# Patient Record
Sex: FEMALE
[De-identification: names, ages, dates, MRNs, and addresses within clinical notes are randomized per-mention and may not be internally consistent; named-entity substitution may affect disease eponyms.]

---

## 2017-07-15 NOTE — RAD
PROCEDURE:  Radiographs of the Right Shoulder



HISTORY:

pain r/o fx







COMPARISON:

No prior.



FINDINGS:



BONES:

Normal. No fracture.



JOINTS:

Normal. Glenohumeral and acromioclavicular joints preserved. No 

osteoarthritis.



SOFT TISSUES:

Soft tissue calcifications seen adjacent to the humeral head 

suggestive of calcified tendinitis



OTHER FINDINGS:

None.



IMPRESSION:

No evidence of acute fracture or dislocation.  Findings suspicious 

for calcified tendinitis.

## 2017-07-15 NOTE — RAD
PROCEDURE:  Radiographs of the right elbow.



HISTORY:

pain s/p trauma r/o fx  



COMPARISON:

No prior.



FINDINGS:



BONES:

Normal. No fracture.



JOINTS:

Normal. No osteoarthritis.



SOFT TISSUES:

Normal.



JOINT EFFUSION:

None.



OTHER FINDINGS:

None.



IMPRESSION:

Unremarkable radiographs of the right elbow.

## 2017-07-15 NOTE — C.PDOC
History Of Present Illness


31 yo female, no PMHx, presents to the ED with right elbow and shoulder pain s/

p injury. She went to sit down on the bus and banged her right elbow. 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 She 

is complaining of pain to her elbow and shoulder that is worsened with 

movement. She took advil yesterday with only moderate relief. No numbness. No 

previous injury to that arm. No head injury or any other injury. 





PMD: Dr. Borja


Time Seen by Provider: 07/15/17 09:22


Chief Complaint (Nursing): Upper Extremity Problem/Injury


History Per: Patient


History/Exam Limitations: no limitations


Quality: Aching


Severity: Moderate





Past Medical History


Reviewed: Historical Data


Vital Signs: 


 Last Vital Signs











Temp  98.7 F   07/15/17 09:20


 


Pulse  79   07/15/17 09:20


 


Resp  17   07/15/17 09:20


 


BP  127/81   07/15/17 09:20


 


Pulse Ox  97   07/15/17 09:38














- Medical History


PMH: No Chronic Diseases


Surgical History: Cholecystectomy





- [facility] Procedures








ARTIF RUPT MEMBRANES NEC (13)


ENDOSCOP INSERTION OF STENT (TUBE) INTO BILE DUCT (14)


ENDOSCOPIC REMOVAL OF STONE(S) FROM BILIARY TRACT (14)


ENDOSCOPIC SPHINCTEROTOMY AND PAPILLOTOMY (14)


LAPAROSCOPIC CHOLECYSTECTOMY (14)


MANUAL ASSIST DELIV NEC (13)








Family History: States: Unknown Family Hx





- Social History


Hx Tobacco Use: No


Hx Alcohol Use: No


Hx Substance Use: No





- Immunization History


Hx Tetanus Toxoid Vaccination: No


Hx Influenza Vaccination: No ()


Hx Pneumococcal Vaccination: Yes





Review Of Systems


Musculoskeletal: Positive for: Shoulder Pain, Arm Pain


Neurological: Positive for: Weakness (right arm).  Negative for: Numbness, 

Incoordination, Headache





Physical Exam





- Physical Exam


Appears: Well, Non-toxic, No Acute Distress


Skin: Normal Color, Warm


Extremity: Normal ROM, Tenderness (right lateral elbow and anterior right 

shoulder), No Swelling


Extremity: Right: Limited ROM To Joint (actively Limited ROM secondary to pain 

but passively can have FROM)


Pulses: Right Radial: Normal


Neurological/Psych: Oriented x3, Normal Cranial Nerves, Normal Motor, Normal 

Sensation





ED Course And Treatment


O2 Sat by Pulse Oximetry: 97





Medical Decision Making


Medical Decision Makin yo female with no PMHx presents with right elbow and shoulder pain s/p 

injury r/o fracture


-- Motrin


-- Xray


-- Reevaluate and disposition





10:22 - Xrays negative. Patient feels better. Placed her arm in slide but 

explained that she needs to move arm to avoid frozen shoulder. She needs to 

take Motrin PRN and follow up with her PMD. Flexeril PRN as well. 





Disposition





- Disposition


Referrals: 


Shaik Borja MD [Staff Provider] - 


Gisel Figueroa MD [Staff Provider] - 


Disposition: HOME/ ROUTINE


Disposition Time: 10:23


Condition: IMPROVED


Additional Instructions: 


Ms Barrientos, thank you for letting us take care of you today. Your provider was 

Dr. Mcallister. You were treated for Right Elbow Injury/Shoulder Strain. The 

emergency medical care you received today was directed at your acute symptoms. 

If you were prescribed any medication, please fill it and take as directed. It 

may take several days for your symptoms to resolve. Return to the Emergency 

Department if your symptoms worsen, do not improve, or if you have any other 

problems.





Please contact your doctor or call one of the physicians/clinics you have been 

referred to that are listed on the Patient Visit Information form that is 

included in your discharge packet. Bring any paperwork you were given at 

discharge with you along with any medications you are taking to your follow up 

visit. Our treatment cannot replace ongoing medical care by a primary care 

provider (PCP) outside of the emergency department.





Thank you for allowing the [facility] team to be part of your care today.








If you had an X-Ray or CT scan: A Radiologist will review the ED reading if any 

change in treatment is needed we will contact you.***





If you had a blood, urine, or wound culture: It will take several days for the 

results, if any change in treatment is needed we will contact you.***





If you had an STI test: It will take 48 hours for the results. Please call 

after 1 week if you have not heard back.***


Prescriptions: 


Cyclobenzaprine [Flexeril] 5 mg PO Q8 PRN #20 tab


 PRN Reason: Pain, Moderate (4-7)


Ibuprofen [Motrin] 600 mg PO Q6 PRN #30 tab


 PRN Reason: Pain, Moderate (4-7)


Instructions:  Rotator Cuff Injury (ED), Elbow Sprain (ED)


Forms:  [facility] [place], [facility] (M[place]ican)





- POA


Present On Arrival: None





- Clinical Impression


Clinical Impression: 


 Elbow contusion, Shoulder strain

## 2019-02-07 NOTE — US
Date of service: 02/07/2019



HISTORY:

RLQ pain, vomiting, ovarian cyst, r/o torsion



COMPARISON:

CT abdomen and pelvis with contrast performed earlier the same day.



TECHNIQUE:

Real-time transabdominal pelvic ultrasound was performed. In addition 

a transvaginal pelvic ultrasound was necessary to better depict 

pelvic anatomy. 



FINDINGS:



UTERUS:

Measures 10.2 x 4.8 x 6.5 cm. Anteverted. 



ENDOMETRIUM:

Measures 1.0 cm in diameter.  IUD. 



CERVIX:

No cervical abnormality identified.



RIGHT OVARY:

Measures 2.5 x 2.4 x 2.6 cm. Blood flow is demonstrated.  



LEFT OVARY:

Measures 5.0 x 3.4 x 4.1 cm. Blood flow is demonstrated.  3.3 x 2.6 x 

2.8 cm left ovarian cyst likely containing small amount of debris.



FREE FLUID:

No significant free fluid noted.



OTHER FINDINGS:

None. 



IMPRESSION:

3.3 x 2.6 x 2.8 cm left ovarian cyst likely containing small amount 

of debris.  Recommend 6 week ultrasound follow-up in order to assess 

for resolution.

## 2019-02-07 NOTE — C.PDOC
History Of Present Illness





34 y/o female comes in complaining of lower abdominal pain and rectal bleeding 

that started a week ago. Patient states that every time she has bowel movement, 

there is some blood. Also states that she feels weak and almost fainted last 

night. Patient had last rectal bleeding years ago and was seen but never 

followed up. Reports she had a cholecystectomy in 2014 and had no problems 

since. Patient described pain as cramping, radiating to lower back. Denies any 

rectal pain. Last menstrual period was 1/15/19 and was normal. 





Chief Complaint (Nursing): Abdominal Pain


History Per: Patient


History/Exam Limitations: no limitations


Onset/Duration Of Symptoms: Days


Current Symptoms Are (Timing): Still Present





Past Medical History


Reviewed: Historical Data, Nursing Documentation, Vital Signs


Vital Signs: 





                                Last Vital Signs











Temp  98.8 F   02/07/19 09:33


 


Pulse  72   02/07/19 09:33


 


Resp  20   02/07/19 09:33


 


BP  122/80   02/07/19 09:33


 


Pulse Ox  100   02/07/19 09:33











Surgical History: Cholecystectomy





- [facility] Procedures











ARTIF RUPT MEMBRANES NEC (08/21/13)


ENDOSCOP INSERTION OF STENT (TUBE) INTO BILE DUCT (11/18/14)


ENDOSCOPIC REMOVAL OF STONE(S) FROM BILIARY TRACT (11/18/14)


ENDOSCOPIC SPHINCTEROTOMY AND PAPILLOTOMY (11/18/14)


LAPAROSCOPIC CHOLECYSTECTOMY (11/18/14)


MANUAL ASSIST DELIV NEC (08/21/13)








Family History: States: No Known Family Hx





- Social History


Hx Tobacco Use: No


Hx Alcohol Use: No


Hx Substance Use: No





- Immunization History


Hx Tetanus Toxoid Vaccination: No


Hx Influenza Vaccination: No (2015)


Hx Pneumococcal Vaccination: Yes





Review Of Systems


Except As Marked, All Systems Reviewed And Found Negative.


Constitutional: Positive for: Weakness.  Negative for: Fever, Chills


Cardiovascular: Negative for: Chest Pain


Respiratory: Negative for: Shortness of Breath


Gastrointestinal: Positive for: Abdominal Pain, Other (Rectal Bleeding).  

Negative for: Nausea, Vomiting, Rectal Pain





Physical Exam





- Physical Exam


Appears: Non-toxic, No Acute Distress


Skin: Warm, Dry


Head: Atraumatic, Normacephalic


Eye(s): bilateral: Normal Inspection


Oral Mucosa: Moist


Neck: Supple


Cardiovascular: Rhythm Regular, No Murmur


Respiratory: Normal Breath Sounds, No Rales, No Rhonchi, No Wheezing


Gastrointestinal/Abdominal: Tenderness (to lower abdomen, right more than left),

Guarding, No Rebound


Rectal: Heme Positive, Hemorrhoids (external), No Tenderness, Other (brown 

stool)


Back: No Vertebral Tenderness, No Paraspinal Tenderness


Extremity: No Pedal Edema


Extremity: Bilateral: Atraumatic, Normal Color And Temperature, Normal ROM


Neurological/Psych: Oriented x3, Normal Speech, Normal Cognition





ED Course And Treatment





- Laboratory Results


Result Diagrams: 


                                 02/07/19 10:54





                                 02/07/19 10:54


O2 Sat by Pulse Oximetry: 100 (RA)


Pulse Ox Interpretation: Normal





- CT Scan/US


  ** CT Abdomen/Pelvis 


Other Rad Studies (CT/US): Interpreted By Me, Read By Radiologist


CT/US Interpretation: Accession No. : [id number].  Patient Name / ID : MORENA GTZ  / [id number].  Exam Date : 02/07/2019 13:51:40 ( Approved ). 

Study Comment :  Sex / Age : F  / 33Y.  Creator : Ernestina Hernandez.  Dictator : 

Didi Jackson MD.   :  Approver : Didi Jackson MD.  

Approver2 :  Report Date : 02/07/2019 14:14:58.  My Comment :  

******************************************************************************

*****.  PROCEDURE:  CT Abdomen and Pelvis with oral and  IV contrast.  HISTORY: 

low abd pain, rectal bleeding.  COMPARISON:  Abdominal ultrasound performed 

12/27/16, MRCP/MRI abdomen performed 11/18/14.  TECHNIQUE:  Contiguous axial 

images of the abdomen and pelvis. Oral and IV contrast was administered. Coronal

and Sagittal reformats generated and reviewed.  Contrast dose: 100 mL Visipaque 

320 IV.  Radiation dose:  Total exam DLP = 1136.17 mGy-cm.  This CT exam was 

performed using one or more of the following dose reduction techniques: 

Automated exposure control, adjustment of the mA and/or kV according to patient 

size, and/or use of iterative reconstruction technique.  FINDINGS:  LOWER TH

ORAX:  No visible consolidation, pleural effusion, or pneumothorax.  Small 

hiatal hernia with gastroesophageal reflux noted.  LIVER:  Unremarkable.  

GALLBLADDER AND BILE DUCTS:  Cholecystectomy. The common bile duct is dilated 

with soft tissue density noted within its lumen; rule out neoplasm. Alter

natively sludge or noncalcified gallstones considered. Common bile duct stent is

present originating in the common bile duct in terminating at the 2/3 portion of

the duodenum.  PANCREAS:  Unremarkable.  SPLEEN:  Unremarkable.  ADRENALS:  

Unremarkable.  KIDNEYS AND URETERS:  The kidneys enhance symmetrically. No 

hydronephrosis or obstructing renal calculus.  BLADDER:  The urinary bladder 

appears unremarkable.  REPRODUCTIVE:  Uterus is present. IUD. 3.1 cm probable 

left ovarian cyst.  APPENDIX:  The appendix appears within normal limits of 

caliber. No secondary signs of acute appendicitis.  BOWEL:  The stomach is 

nondistended.  The bowel loops appear within normal limits of caliber without 

evidence of intestinal obstruction.  Colonic wall thickening/mucosal edema; 

correlate clinically for colitis. Questionable rectal wall thickening may be 

seen in the setting of proctitis.  PERITONEUM:  No significant free fluid. No 

definite free air.  LYMPH NODES:  No bulky lymphadenopathy identified.  

VASCULATURE:  No aortic aneurysm. No atherosclerotic calcification or mural 

plaque present.  BONES:  No acute osseous abnormality is detected.  OTHER 

FINDINGS:  None.  IMPRESSION:  Colonic wall thickening/mucosal edema; correlate 

clinically for colitis. Questionable rectal wall thickening may be seen in the 

setting of proctitis. Correlate clinically.  Cholecystectomy. Common bile duct 

stent. The common bile duct is dilated with soft tissue density noted within its

lumen; rule out neoplasm. Alternatively sludge or noncalcified gallstones 

considered.  3.1 cm probable left ovarian cyst. Pelvic ultrasound may be 

considered for further evaluation.  IUD present.  Small hiatal hernia and 

gastroesophageal reflux.  Additional findings as above.





  ** pelvic US


Other Rad Studies (CT/US): Read By Radiologist, Radiology Report Reviewed


CT/US Interpretation: Accession No. : [id number].  Patient Name / ID : MORENA GTZ  / [id number].  Exam Date : 02/07/2019 15:43:44 ( Approved ). 

Study Comment :  Sex / Age : F  / 33Y.  Creator : Didi Jackson MD.  

Dictator : Didi Jackson MD.   :  Approver : Bobbi Jackson MD.  Approver2 :  Report Date : 02/07/2019 16:54:04.  My Comment :  

**************************

*********************************************************.  Date of service: 

02/07/2019.  HISTORY:  RLQ pain, vomiting, ovarian cyst, r/o torsion.  

COMPARISON:  CT abdomen and pelvis with contrast performed earlier the same day.

 TECHNIQUE:  Real-time transabdominal pelvic ultrasound was performed. In 

addition a transvaginal pelvic ultrasound was necessary to better depict pelvic 

anatomy.  FINDINGS:  UTERUS:  Measures 10.2 x 4.8 x 6.5 cm. Anteverted.  

ENDOMETRIUM:  Measures 1.0 cm in diameter.  IUD.  CERVIX:  No cervical 

abnormality identified.  RIGHT OVARY:  Measures 2.5 x 2.4 x 2.6 cm. Blood flow 

is demonstrated.  LEFT OVARY:  Measures 5.0 x 3.4 x 4.1 cm. Blood flow is 

demonstrated.  3.3 x 2.6 x 2.8 cm left ovarian cyst likely containing small 

amount of debris.  FREE FLUID:  No significant free fluid noted.  OTHER 

FINDINGS:  None.  IMPRESSION:  3.3 x 2.6 x 2.8 cm left ovarian cyst likely 

containing small amount of debris.  Recommend 6 week ultrasound follow-up in 

order to assess for resolution.


Progress Note: Abd/Pel CT with IV and PO contrast ordered. Labs, stool  for 

occult blood, and UA ordered. Administered Protonix IV, and IV fluids. Patient 

had CT and pelvic US, pos for ovarian cyst. Rectal exam positive for external 

hemorrhoid, rectally brown stool, heme positive. CBC w/o signs of anemia. On re-

evaluation patient feels better, not in pain, tolerates po , no active rectal 

bleeding. Patient was given copies af all labs and imaging stadies. Patient was 

instructed to follow up with PMD, OBGYN and Gastroenterologist ASAP.





Disposition





- Disposition


Referrals: 


Shaik Borja MD [Staff Provider] - 


Disposition: HOME/ ROUTINE


Disposition Time: 17:58


Condition: STABLE


Additional Instructions: 


Follow up with your PMD, Gastroenterologist and OBGYN within 2-3 days. Return to

ED immediately if feel worse.


Prescriptions: 


Ondansetron ODT [Zofran ODT] 4 mg PO .Q4-6H PRN #20 odt


 PRN Reason: Nausea/Vomiting


Instructions:  Ovarian Cysts, Bloody Stools, Adult (DC)


Forms:  [facility] (English), Work Excuse


Print Language: Croatian





- Clinical Impression


Clinical Impression: 


 Abdominal pain, Ovarian cyst, Rectal bleed








- PA / NP / Resident Statement


MD/DO has reviewed & agrees with the documentation as recorded.





- Scribe Statement


The provider has reviewed the documentation as recorded by the Cedricibblake Saunders





All medical record entries made by the Tanya were at my direction and 

personally dictated by me. I have reviewed the chart and agree that the record 

accurately reflects my personal performance of the history, physical exam, 

medical decision making, and the department course for this patient. I have also

personally directed, reviewed, and agree with the discharge instructions and 

disposition.

## 2019-02-07 NOTE — CT
PROCEDURE:  CT Abdomen and Pelvis with oral and  IV contrast.



HISTORY:

low abd pain, rectal bleeding



COMPARISON:

Abdominal ultrasound performed 12/27/16, MRCP/MRI abdomen performed 

11/18/14



TECHNIQUE:

Contiguous axial images of the abdomen and pelvis. Oral and IV 

contrast was administered. Coronal and Sagittal reformats generated 

and reviewed. 



Contrast dose: 100 mL Visipaque 320 IV



Radiation dose:



Total exam DLP = 1136.17 mGy-cm.



This CT exam was performed using one or more of the following dose 

reduction techniques: Automated exposure control, adjustment of the 

mA and/or kV according to patient size, and/or use of iterative 

reconstruction technique.



FINDINGS:





LOWER THORAX:

No visible consolidation, pleural effusion, or pneumothorax.



Small hiatal hernia with gastroesophageal reflux noted. 



LIVER:

Unremarkable. 



GALLBLADDER AND BILE DUCTS:

Cholecystectomy. The common bile duct is dilated with soft tissue 

density noted within its lumen; rule out neoplasm. Alternatively 

sludge or noncalcified gallstones considered. Common bile duct stent 

is present originating in the common bile duct in terminating at the 

2/3 portion of the duodenum. 



PANCREAS:

Unremarkable. 



SPLEEN:

Unremarkable. 



ADRENALS:

Unremarkable. 



KIDNEYS AND URETERS:

The kidneys enhance symmetrically. No hydronephrosis or obstructing 

renal calculus. 



BLADDER:

The urinary bladder appears unremarkable.



REPRODUCTIVE:

Uterus is present. IUD. 3.1 cm probable left ovarian cyst. 



APPENDIX:

The appendix appears within normal limits of caliber. No secondary 

signs of acute appendicitis.



BOWEL:

The stomach is nondistended. 



The bowel loops appear within normal limits of caliber without 

evidence of intestinal obstruction.



Colonic wall thickening/mucosal edema; correlate clinically for 

colitis. Questionable rectal wall thickening may be seen in the 

setting of proctitis. 



PERITONEUM:

No significant free fluid. No definite free air.



LYMPH NODES:

No bulky lymphadenopathy identified.



VASCULATURE:

No aortic aneurysm. No atherosclerotic calcification or mural plaque 

present. 



BONES:

No acute osseous abnormality is detected.



OTHER FINDINGS:

None. 



IMPRESSION:

Colonic wall thickening/mucosal edema; correlate clinically for 

colitis. Questionable rectal wall thickening may be seen in the 

setting of proctitis. Correlate clinically. 



Cholecystectomy. Common bile duct stent. The common bile duct is 

dilated with soft tissue density noted within its lumen; rule out 

neoplasm. Alternatively sludge or noncalcified gallstones considered. 



3.1 cm probable left ovarian cyst. Pelvic ultrasound may be 

considered for further evaluation. 



IUD present. 



Small hiatal hernia and gastroesophageal reflux. 



Additional findings as above.

## 2019-02-09 NOTE — CP.PCM.CON
History of Present Illness





- History of Present Illness


History of Present Illness: 


SURGERY CONSULT NOTE FOR DR. AGUIRRE





34yo F with PMHx of choledolithiasis presents to the ED with abdominal pain. She

has diffuse abdominal pain, worse in the upper abdomen, that radiates to her 

back for about a week. She had a bloody stool 8 days ago with some other bloody 

stools since. Last BM was yesterday and was normal. She vomited once today.


Patient came to [facility] ED on 2/7/9. She had a CT which showed colonic wall 

thickening/mucosal edema, possible colitis, questionable rectal wall thickening,

CBD stent, CBD dilated w/ soft tissue density in lumen, r/o neoplasm vs sludge 

or noncalcified gallstones, 3.1cm probable left ovarian cyst, small hiatal 

hernia. Pelvic US showed left ovarian cyst. Her symptoms resolved and she was 

discharged home. She followed up with OB-GYN and per patient was told she had a 

cyst. 





PMHx: none





Surgeries: lap bill by Dr. Cross in 2014, ERCP w/ sphincteroplasty, CBD 

stone extraction, stent placement in 2014





Allergies: none





Medications: none





Social history: denies etoh, tobacco or illicit drug use





Review of Systems





- Review of Systems


All systems: reviewed and no additional remarkable complaints except (as per HPI

)





Past Patient History





- Infectious Disease


Hx of Infectious Diseases: None





- Past Medical History & Family History


Past Medical History?: No





- Past Social History


Smoking Status: Never Smoked





- MUSCULOSKELETAL/RHEUMATOLOGICAL


Hx Falls: No





- PSYCHIATRIC


Hx Substance Use: No





- SURGICAL HISTORY


Hx Cholecystectomy: Yes





- ANESTHESIA


Hx Anesthesia: No


Hx Anesthesia Reactions: No


Hx Malignant Hyperthermia: No





Meds


Allergies/Adverse Reactions: 


                                    Allergies











Allergy/AdvReac Type Severity Reaction Status Date / Time


 


No Known Allergies Allergy   Verified 02/07/19 09:34














- Medications


Medications: 


                               Current Medications





Enoxaparin Sodium (Lovenox)  40 mg SC DAILY [facility]


   Last Admin: 02/09/19 09:17 Dose:  40 mg


Sodium Chloride (Sodium Chloride 0.9%)  1,000 mls @ 125 mls/hr IV .Q8H [facility]


   Last Admin: 02/09/19 12:22 Dose:  125 mls/hr


Influenza Virus Vaccine (Flucelvax Quad 6299-8029 Syr)  60 mcg IM .ONCE ONE


   Stop: 02/12/19 14:01


Morphine Sulfate (Morphine)  2 mg IVP Q4 PRN


   PRN Reason: pain scale 5-9


   Last Admin: 02/09/19 19:13 Dose:  2 mg


Ondansetron HCl (Zofran Inj)  4 mg IVP Q4 PRN


   PRN Reason: Nausea/Vomiting


   Last Admin: 02/09/19 16:07 Dose:  4 mg











Physical Exam





- Constitutional


Appears: Non-toxic, No Acute Distress





- Head Exam


Head Exam: ATRAUMATIC, NORMAL INSPECTION





- Eye Exam


Eye Exam: EOMI, Normal appearance





- Respiratory Exam


Respiratory Exam: NORMAL BREATHING PATTERN.  absent: Respiratory Distress





- Cardiovascular Exam


Cardiovascular Exam: +S1, +S2





- GI/Abdominal Exam


GI & Abdominal Exam: Soft, Tenderness (mild epigastric tenderness).  absent: 

Distended, Firm, Guarding, Rebound





- Back Exam


Back exam: CVA tenderness (L)





- Neurological Exam


Neurological exam: Alert, CN II-XII Intact, Oriented x3





- Psychiatric Exam


Psychiatric exam: Normal Affect, Normal Mood





- Skin


Skin Exam: Dry, Normal Color, Warm





Results





- Vital Signs


Recent Vital Signs: 


                                Last Vital Signs











Temp  100.7 F H  02/09/19 16:10


 


Pulse  120 H  02/09/19 16:10


 


Resp  20   02/09/19 16:10


 


BP  95/61 L  02/09/19 16:10


 


Pulse Ox  96   02/09/19 20:27














- Labs


Result Diagrams: 


                                 02/09/19 03:29





                                 02/09/19 03:29


Labs: 


                         Laboratory Results - last 24 hr











  02/09/19 02/09/19 02/09/19





  03:29 03:29 03:29


 


WBC  10.3  


 


RBC  4.50  


 


Hgb  14.3  


 


Hct  40.6  


 


MCV  90.1  


 


MCH  31.7 H  


 


MCHC  35.2  


 


RDW  12.8  


 


Plt Count  271  


 


MPV  7.9  


 


Neut % (Auto)  75.9 H  


 


Lymph % (Auto)  12.2 L  


 


Mono % (Auto)  10.3 H  


 


Eos % (Auto)  1.2  


 


Baso % (Auto)  0.4  


 


Neut # (Auto)  7.8 H  


 


Lymph # (Auto)  1.3  


 


Mono # (Auto)  1.1 H  


 


Eos # (Auto)  0.1  


 


Baso # (Auto)  0.0  


 


PT   


 


INR   


 


APTT   


 


Sodium   140 


 


Potassium   3.4 L 


 


Chloride   109 H 


 


Carbon Dioxide   24 


 


Anion Gap   10 


 


BUN   10 


 


Creatinine   0.7 


 


Est GFR ( Amer)   > 60 


 


Est GFR (Non-Af Amer)   > 60 


 


Random Glucose   114 H D 


 


Calcium   8.9 


 


Total Bilirubin   1.5 H 


 


AST   720 H D 


 


ALT   973 H D 


 


Alkaline Phosphatase   224 H D 


 


Total Protein   6.9 


 


Albumin   4.2 


 


Globulin   2.7 


 


Albumin/Globulin Ratio   1.6 


 


Amylase   83 


 


Lipase   225 


 


Beta HCG, Quant   < 2.39 


 


Urine Color    Sarah


 


Urine Clarity    Clear


 


Urine pH    5.0


 


Ur Specific [place]    1.011


 


Urine Protein    Negative


 


Urine Glucose (UA)    Normal


 


Urine Ketones    Negative


 


Urine Blood    Trace H


 


Urine Nitrate    Negative


 


Urine Bilirubin    Negative


 


Urine Urobilinogen    4.0 H


 


Ur Leukocyte Esterase    Neg


 


Urine WBC (Auto)    1


 


Urine RBC (Auto)    < 1


 


Ur Squamous Epith Cells    7 H


 


Urine Bacteria    Rare


 


Urine HCG, Qual   


 


Hepatitis A IgM Ab   


 


Hep Bs Antigen   


 


Hep B Core IgM Ab   


 


Hepatitis C Antibody   














  02/09/19 02/09/19 02/09/19





  05:33 11:24 11:24


 


WBC   


 


RBC   


 


Hgb   


 


Hct   


 


MCV   


 


MCH   


 


MCHC   


 


RDW   


 


Plt Count   


 


MPV   


 


Neut % (Auto)   


 


Lymph % (Auto)   


 


Mono % (Auto)   


 


Eos % (Auto)   


 


Baso % (Auto)   


 


Neut # (Auto)   


 


Lymph # (Auto)   


 


Mono # (Auto)   


 


Eos # (Auto)   


 


Baso # (Auto)   


 


PT    12.0


 


INR    1.1


 


APTT    32


 


Sodium   


 


Potassium   


 


Chloride   


 


Carbon Dioxide   


 


Anion Gap   


 


BUN   


 


Creatinine   


 


Est GFR ( Amer)   


 


Est GFR (Non-Af Amer)   


 


Random Glucose   


 


Calcium   


 


Total Bilirubin   


 


AST   


 


ALT   


 


Alkaline Phosphatase   


 


Total Protein   


 


Albumin   


 


Globulin   


 


Albumin/Globulin Ratio   


 


Amylase   


 


Lipase   


 


Beta HCG, Quant   


 


Urine Color   


 


Urine Clarity   


 


Urine pH   


 


Ur Specific Grav[place]   


 


Urine Protein   


 


Urine Glucose (UA)   


 


Urine Ketones   


 


Urine Blood   


 


Urine Nitrate   


 


Urine Bilirubin   


 


Urine Urobilinogen   


 


Ur Leukocyte Esterase   


 


Urine WBC (Auto)   


 


Urine RBC (Auto)   


 


Ur Squamous Epith Cells   


 


Urine Bacteria   


 


Urine HCG, Qual  Negative  


 


Hepatitis A IgM Ab   Negative 


 


Hep Bs Antigen   Negative 


 


Hep B Core IgM Ab   Negative 


 


Hepatitis C Antibody   Negative 














Assessment & Plan





- Assessment and Plan (Free Text)


Assessment: 


34yo F with PMHx of choledocholithiasis s/p ERCP w/ stent in 2014 who presents 

with abdominal pain. 





- No leukocytosis, elevated bilirubin & LFTs


- Recommend GI workup, possible MRCP or ERCP for CBD evaluation and possible 

stent removal


- Discussed with Dr. Mayra Alfonso PGY-4

## 2019-02-09 NOTE — CP.PCM.PN
Subjective





- Date & Time of Evaluation


Date of Evaluation: 02/09/19


Time of Evaluation: 11:55





- Subjective


Subjective: 





findings as noted


has existing biliary stent  placed a few years ago, also previous GB surgery by 

another surgeon





Not aware of reason for initial stent, but would recommend re evaluation re 

continued presence and option of removal





Objective





- Vital Signs/Intake and Output


Vital Signs (last 24 hours): 


                                        











Temp Pulse Resp BP Pulse Ox


 


 98.4 F   78   20   110/65   97 


 


 02/09/19 07:00  02/09/19 07:00  02/09/19 07:00  02/09/19 07:00  02/09/19 07:00











- Medications


Medications: 


                               Current Medications





Enoxaparin Sodium (Lovenox)  40 mg SC DAILY [facility]


   Last Admin: 02/09/19 09:17 Dose:  40 mg


Sodium Chloride (Sodium Chloride 0.9%)  1,000 mls @ 125 mls/hr IV .Q8H [facility]


   Last Admin: 02/09/19 05:33 Dose:  125 mls/hr


Influenza Virus Vaccine (Flucelvax Quad 9348-6075 Syr)  60 mcg IM .ONCE ONE


   Stop: 02/12/19 14:01


Morphine Sulfate (Morphine)  2 mg IVP Q4 PRN


   PRN Reason: pain scale 5-9


   Last Admin: 02/09/19 09:01 Dose:  2 mg











- Labs


Labs: 


                                        





                                 02/09/19 03:29 





                                 02/09/19 03:29 





                                        











PT  12.0 SECONDS (9.7-12.2)   02/09/19  11:24    


 


INR  1.1   02/09/19  11:24    


 


APTT  32 SECONDS (21-34)   02/09/19  11:24

## 2019-02-09 NOTE — C.PDOC
Time Seen by Provider: 02/09/19 02:04


Chief Complaint (Nursing): Abdominal Pain





Past Medical History


Vital Signs: 





                                Last Vital Signs











Temp  98.9 F   02/09/19 02:02


 


Pulse  80   02/09/19 02:02


 


Resp  16   02/09/19 02:02


 


BP  125/83   02/09/19 02:02


 


Pulse Ox  100   02/09/19 02:02











Surgical History: Cholecystectomy





- [facility] Procedures











ARTIF RUPT MEMBRANES NEC (08/21/13)


ENDOSCOP INSERTION OF STENT (TUBE) INTO BILE DUCT (11/18/14)


ENDOSCOPIC REMOVAL OF STONE(S) FROM BILIARY TRACT (11/18/14)


ENDOSCOPIC SPHINCTEROTOMY AND PAPILLOTOMY (11/18/14)


LAPAROSCOPIC CHOLECYSTECTOMY (11/18/14)


MANUAL ASSIST DELIV NEC (08/21/13)








Family History: States: Unknown Family Hx





- Social History


Hx Tobacco Use: No


Hx Alcohol Use: No


Hx Substance Use: No





- Immunization History


Hx Tetanus Toxoid Vaccination: No


Hx Influenza Vaccination: No (2015)


Hx Pneumococcal Vaccination: Yes





ED Course And Treatment





- Laboratory Results


Result Diagrams: 


                                 02/09/19 03:29





                                 02/09/19 03:29


Lab Results: 





                                        











Total Bilirubin  1.5 mg/dL (0.2-1.3)  H  02/09/19  03:29    


 


AST  720 U/L (14-36)  H D 02/09/19  03:29    


 


ALT  973 U/L (9-52)  H D 02/09/19  03:29    


 


Alkaline Phosphatase  224 U/L ()  H D 02/09/19  03:29    


 


Total Protein  6.9 g/dL (6.3-8.3)   02/09/19  03:29    


 


Albumin  4.2 g/dL (3.5-5.0)   02/09/19  03:29    


 


Globulin  2.7 gm/dL (2.2-3.9)   02/09/19  03:29    


 


Albumin/Globulin Ratio  1.6  (1.0-2.1)   02/09/19  03:29    








                                        











Amylase  83 U/L ()   02/09/19  03:29    


 


Lipase  225 U/L ()   02/09/19  03:29    








                                        











Urine Color  Sarah  (YELLOW)   02/09/19  03:29    


 


Urine Clarity  Clear  (Clear)   02/09/19  03:29    


 


Urine pH  5.0  (5.0-8.0)   02/09/19  03:29    


 


Ur Specific Gravity  1.011  (1.003-1.030)   02/09/19  03:29    


 


Urine Protein  Negative mg/dL (NEGATIVE)   02/09/19  03:29    


 


Urine Glucose (UA)  Normal mg/dL (Normal)   02/09/19  03:29    


 


Urine Ketones  Negative mg/dL (NEGATIVE)   02/09/19  03:29    


 


Urine Blood  Trace  (NEGATIVE)  H  02/09/19  03:29    


 


Urine Nitrate  Negative  (NEGATIVE)   02/09/19  03:29    


 


Urine Bilirubin  Negative  (NEGATIVE)   02/09/19  03:29    


 


Urine Urobilinogen  4.0 mg/dL (0.2-1.0)  H  02/09/19  03:29    


 


Ur Leukocyte Esterase  Neg Ana/uL (Negative)   02/09/19  03:29    


 


Urine WBC (Auto)  1 /hpf (0-5)   02/09/19  03:29    


 


Urine RBC (Auto)  < 1 /hpf (0-3)   02/09/19  03:29    


 


Ur Squamous Epith Cells  7 /hpf (0-5)  H  02/09/19  03:29    


 


Urine Bacteria  Rare  (<OCC)   02/09/19  03:29    











O2 Sat by Pulse Oximetry: 100





Disposition





- Disposition


Disposition: HOSPITALIZED


Condition: STABLE


Forms:  [facility] (English)





- PA / NP / Resident Statement


MD/ has reviewed & agrees with the documentation as recorded.

## 2019-02-09 NOTE — C.PDOC
History Of Present Illness


33 year old female presents to the ED with intractable abdominal pain associated

with 2 episodes of vomiting, nausea, dizziness and poor appetite. Patient was 

seen on 02/07 for lower abdominal pain and rectal bleeding. At that time p

atient, CT scan and pelvic US revealed colitis, dilation of CBD with soft tissue

density and ovarian cyst. She was stable upon discharge and was referred to GYN 

and GI for further evaluation. She states that the pain has become progressively

worse with minimal relief with pain medications. She states pain worsens with 

food consumption. Patient denies fever, chills, diarrhea, back pain, dysuria, he

maturia, rash, recent travel, and sick contacts. She denies rectal bleeding 

today. 


Time Seen by Provider: 02/09/19 02:04


Chief Complaint (Nursing): Abdominal Pain


History Per: Patient


History/Exam Limitations: no limitations


Onset/Duration Of Symptoms: Days


Current Symptoms Are (Timing): Still Present


Context: Food


Location Of Pain/Discomfort: RUQ, LUQ


Radiation Of Pain To:: None


Quality Of Discomfort: "Pain"


Associated Symptoms: Nausea, Vomiting, Loss Of Appetite.  denies: Diarrhea, 

Urinary Symptoms


Exacerbating Factors: Food


Last Bowel Movement: Today


Recent travel outside of the [place]: No


Additional History Per: Patient


Abnormal Vaginal Bleeding: No





Past Medical History


Reviewed: Historical Data, Nursing Documentation, Vital Signs


Surgical History: Cholecystectomy





- [facility] Procedures











ARTIF RUPT MEMBRANES NEC (08/21/13)


ENDOSCOP INSERTION OF STENT (TUBE) INTO BILE DUCT (11/18/14)


ENDOSCOPIC REMOVAL OF STONE(S) FROM BILIARY TRACT (11/18/14)


ENDOSCOPIC SPHINCTEROTOMY AND PAPILLOTOMY (11/18/14)


LAPAROSCOPIC CHOLECYSTECTOMY (11/18/14)


MANUAL ASSIST DELIV NEC (08/21/13)








Family History: States: Unknown Family Hx





- Social History


Hx Tobacco Use: No


Hx Alcohol Use: No


Hx Substance Use: No





- Immunization History


Hx Tetanus Toxoid Vaccination: No


Hx Influenza Vaccination: No (2015)


Hx Pneumococcal Vaccination: Yes





Review Of Systems


Constitutional: Negative for: Fever, Chills


Cardiovascular: Negative for: Chest Pain, Palpitations


Respiratory: Negative for: Cough, Shortness of Breath


Gastrointestinal: Positive for: Nausea, Vomiting, Abdominal Pain.  Negative for:

Diarrhea


Genitourinary: Negative for: Dysuria, Hematuria


Musculoskeletal: Negative for: Back Pain


Skin: Negative for: Rash


Neurological: Negative for: Weakness, Numbness, Headache





Physical Exam





- Physical Exam


Appears: Non-toxic, No Acute Distress


Skin: Normal Color, Warm, Dry


Head: Atraumatic, Normacephalic


Eye(s): bilateral: Normal Inspection


Nose: Flaring


Oral Mucosa: Moist


Neck: Normal ROM, Supple


Chest: Symmetrical


Cardiovascular: Rhythm Regular


Respiratory: Normal Breath Sounds, No Rales, No Rhonchi, No Wheezing, No Other


Gastrointestinal/Abdominal: Soft, Tenderness (to palpation bilateral upper 

quadrants), No Guarding, No Rebound


Back: No CVA Tenderness


Extremity: Normal ROM, No Tenderness, No Swelling


Neurological/Psych: Oriented x3, Normal Speech, Normal Cognition, Normal 

Sensation





ED Course And Treatment





- Laboratory Results


Result Diagrams: 


                                 02/09/19 03:29





                                 02/09/19 03:29


O2 Sat by Pulse Oximetry: 98 (ON RA)


Pulse Ox Interpretation: Normal





Medical Decision Making


Medical Decision Making: 


Plan:


* Labs


* Morphine 4 mg IVP


* Protonix 40 mg IVP


* Zofran 4 mg IVP


* UA





Labs were reviewed and showed significant elevation of LFTs compared to previous

visit on 02/07/19.





04:07 - D/W with Dr. Aviles  and he accepts patient for admission to his service

 and requests to have Dr. Benítez on GI consult and Dr. Stack for Surgery 

consult.





04:30 - on  reevaluation patient was still c/o abdominal pain, toradol IV was 

given. Patient resting awaiting transfer.  





Disposition





- Disposition


Disposition: HOSPITALIZED


Disposition Time: 04:40


Condition: STABLE





- Clinical Impression


Clinical Impression: 


 Abdominal wall pain, Ovarian cyst, Vomiting








- PA / NP / Resident Statement


MD/DO has reviewed & agrees with the documentation as recorded.





- Scribe Statement


The provider has reviewed the documentation as recorded by the Scribe


Herman Boston





All medical record entries made by the Scribe were at my direction and 

personally dictated by me. I have reviewed the chart and agree that the record 

accurately reflects my personal performance of the history, physical exam, 

medical decision making, and the department course for this patient. I have also

personally directed, reviewed, and agree with the discharge instructions and 

disposition.








Decision To Admit





- Pt Status Changed To:


Hospital Disposition Of: Observation





- .


Bed Request Type: Regular


Admitting Physician: Armin Aviles


Patient Diagnosis: 


 Ovarian cyst, Abdominal wall pain, Vomiting

## 2019-02-10 ENCOUNTER — HOSPITAL ENCOUNTER (OUTPATIENT)
Dept: HOSPITAL 42 - RAD | Age: 34
End: 2019-02-10
Payer: COMMERCIAL

## 2019-02-10 NOTE — CP.PCM.CON
History of Present Illness





- History of Present Illness


History of Present Illness: 





34yo F with PMHx of choledolithiasis presents to the ED with abdominal pain. She

has diffuse abdominal pain for about a week. She had a bloody stool 8 days ago 

with some other bloody stools since.





Went to [place] for MRCP 











Patient came to [facility] ED on 2/7/9. She had a CT which showed colonic wall 

thickening/mucosal edema, possible colitis, questionable rectal wall thickening,

CBD stent, CBD dilated w/ soft tissue density in lumen, r/o neoplasm vs sludge 

or noncalcified gallstones, 3.1cm probable left ovarian cyst, small hiatal h

ernia. Pelvic US showed left ovarian cyst. Her symptoms resolved and she was 

discharged home. She followed up with OB-GYN and per patient was told she had a 

cyst. 





PMHx: none





Surgeries: lap bill by Dr. Cross in 2014, ERCP w/ sphincteroplasty, CBD 

stone extraction, stent placement in 2014





Allergies: none





Medications: none





Social history: denies etoh, tobacco or illicit drug use








Review of Systems





- Constitutional


Constitutional: As Per HPI





- EENT


Eyes: absent: As Per HPI, Blind Spots, Blurred Vision, Change in Vision, 

Decreased Night Vision, Diplopia, Discharge, Dry Eye, Exophthalmos, Floaters, 

Irritation, Itchy Eyes, Loss of Peripheral Vision, Pain, Photophobia, Requires 

Corrective Lenses, Sees Flashes, Spots in Vision, Tunnel Vision, Other Visual 

Disturbances, Loss of Vision, Other


Ears: absent: As Per HPI, Decreased Hearing, Ear Discharge, Ear Pain, Tinnitus, 

Abnormal Hearing, Disequilibrium, Dizziness, Other


Nose/Mouth/Throat: absent: As Per HPI, Epistaxis, Nasal Congestion, Nasal 

Discharge, Nasal Obstruction, Nasal Trauma, Nose Pain, Post Nasal Drip, Sinus 

Pain, Sinus Pressure, Bleeding Gums, Change in Voice, Dental Pain, Dry Mouth, 

Dysphagia, Halitosis, Hoarsness, Lip Swelling, Mouth Lesions, Mouth Pain, Odyno

phagia, Sore Throat, Throat Swelling, Tongue Swelling, Facial Pain, Neck Pain, 

Neck Mass, Other





- Breasts


Breasts: absent: As Per HPI, Change in Shape, Mass, Pain, Nipple Discharge, 

Nipple Inversion, Skin Changes, Swelling, Other





- Cardiovascular


Cardiovascular: absent: As Per HPI, Acrocyanosis, Chest Pain, Chest Pain at 

Rest, Chest Pain with Activity, Claudication, Diaphoresis, Dyspnea, Dyspnea on 

Exertion, Edema, Irregular Heart Rhythm, Pain Radiating to Arm/Neck/Jaw, Leg 

Edema, Leg Ulcers, Lightheadedness, Orthopnea, Palpitations, Paroxysmal 

Nocturnal Dyspnea, Pedal Edema, Radiating Pain, Rapid Heart Rate, Slow Heart 

Rate, Syncope, Other





- Respiratory


Respiratory: absent: As Per HPI, Cough, Dyspnea, Hemoptysis, Dyspnea on 

Exertion, Wheezing, Snoring, Stridor, Pain on Inspiration, Chest Congestion, 

Excessive Mucous Production, Change in Mucous Color, Pain with Coughing, Other





- Gastrointestinal


Gastrointestinal: As Per HPI





- Genitourinary


Genitourinary: absent: As Per HPI, Change in Urinary Stream, Difficulty 

Urinating, Dysuria, Flank Pain, Hematuria, Pyuria, Nocturia, Urinary Incontine

nce, Urinary Frequency, Urinary Hesitance, Urinary Urgency, Voiding Freq/Small 

Amts, Freq UTI, Hx Renal/Bladder Calculi, Hx /Renal Surgery, Bladder 

Distension, Other





- Reproductive: Female


Reproductive:Female: absent: As Per HPI, Amenorrhea, Amenorrhea/Birth Control, 

Currently Menstual, Cycle <21 Days, Cycle >35 Days, Cycle Variable, Menses 1-7 

Days, Menses >/= 8 Days, Menses Variable, Cycle > 4 Weeks Between, No Menses for

6 Months, Heavy Menses, Light Menses, Normal Menses, Spotting Between Cycles, 

S/P Hysterectomy, Menopausal, Post Menopausal, Premenarche, Abnormal Vaginal 

Bleeding, Dysmenorrhea, Dyspareunia, Genital Lesions, Genital Pruritis, Pelvic 

Pain, Prolapse Symptoms, Sexual Dysfunction, Vaginal Discharge, Vaginal Dryness,

Vaginal Odor, Vaginal Pruritis, Other





- Menstruation


Menstruation: absent: As Per HPI, Amenorrhea, Amenorrhea/Birth Control, 

Currently Menstual, Cycle <21 Days, Cycle >35 Days, Cycle Variable, Menses 1-7 

Days, Menses >/= 8 Days, Menses Variable, Cycle > 4 Weeks Between, No Menses for

6 Months, Heavy Menses, Light Menses, Normal Menses, Spotting Between Cycles, 

S/P Hysterectomy, Menopausal, Post Menopausal, Premenarche, Abnormal Vaginal 

Bleeding, Dysmenorrhea, Other





- Musculoskeletal


Musculoskeletal: absent: As Per HPI, Abnormal Gait, Arthralgias, Atrophy, Back 

Pain, Deformity, Joint Swelling, Limited Range of Motion, Loss of Height, Muscle

Cramps, Muscle Weakness, Myalgias, Neck Pain, Numbness, Radiating Pain into 

Limb, Stiffness, Tingling, Other





Past Patient History





- Infectious Disease


Hx of Infectious Diseases: None





- Past Medical History & Family History


Past Medical History?: No





- Past Social History


Smoking Status: Never Smoked





- MUSCULOSKELETAL/RHEUMATOLOGICAL


Hx Falls: No





- PSYCHIATRIC


Hx Substance Use: No





- SURGICAL HISTORY


Hx Cholecystectomy: Yes





- ANESTHESIA


Hx Anesthesia: No


Hx Anesthesia Reactions: No


Hx Malignant Hyperthermia: No





Meds


Allergies/Adverse Reactions: 


                                    Allergies











Allergy/AdvReac Type Severity Reaction Status Date / Time


 


No Known Allergies Allergy   Verified 02/07/19 09:34














- Medications


Medications: 


                               Current Medications





Acetaminophen (Tylenol 325mg Tab)  650 mg PO Q6 PRN


   PRN Reason: Fever >100.4 F


   Last Admin: 02/10/19 02:02 Dose:  650 mg


Enoxaparin Sodium (Lovenox)  40 mg SC DAILY [facility]


   Last Admin: 02/10/19 09:41 Dose:  40 mg


Sodium Chloride (Sodium Chloride 0.9%)  1,000 mls @ 125 mls/hr IV .Q8H [facility]


   Last Admin: 02/10/19 12:00 Dose:  Not Given


Ciprofloxacin (Cipro 400mg/200ml Dsw)  400 mg in 200 mls @ 133 mls/hr IVPB Q12H 

[facility]; Protocol


   Last Admin: 02/10/19 10:25 Dose:  133 mls/hr


Metronidazole (Flagyl)  500 mg in 100 mls @ 100 mls/hr IVPB Q8H [facility]; Protocol


   Last Admin: 02/10/19 10:00 Dose:  100 mls/hr


Potassium Chloride (Potassium Chloride 20 Meq/100 Ml)  20 meq in 100 mls @ 50 

mls/hr IVPB Q2 [facility]


   Stop: 02/10/19 15:59


   Last Admin: 02/10/19 11:59 Dose:  50 mls/hr


Influenza Virus Vaccine (Flucelvax Quad [id number] Syr)  60 mcg IM .ONCE ONE


   Stop: 02/12/19 14:01


Morphine Sulfate (Morphine)  2 mg IVP Q4 PRN


   PRN Reason: pain scale 5-9


   Last Admin: 02/10/19 13:04 Dose:  2 mg


Ondansetron HCl (Zofran Inj)  4 mg IVP Q4 PRN


   PRN Reason: Nausea/Vomiting


   Last Admin: 02/10/19 12:44 Dose:  4 mg











Physical Exam





- Constitutional


Appears: No Acute Distress, Chronically Ill





- Head Exam


Head Exam: ATRAUMATIC, NORMOCEPHALIC





- Eye Exam


Eye Exam: absent: Scleral icterus





- ENT Exam


ENT Exam: Mucous Membranes Dry, Normal External Ear Exam, Normal Oropharynx





- Neck Exam


Neck exam: Negative for: Lymphadenopathy





- Respiratory Exam


Respiratory Exam: Decreased Breath Sounds, Clear to Auscultation Bilateral





- Cardiovascular Exam


Cardiovascular Exam: REGULAR RHYTHM, +S1, +S2





- GI/Abdominal Exam


GI & Abdominal Exam: Diminished Bowel Sounds, Distended, Guarding, Soft, 

Tenderness.  absent: Pulsatile Mass, Rebound, Rigid





- Rectal Exam


Rectal Exam: Deferred





-  Exam


 Exam: NORMAL INSPECTION





- Extremities Exam


Extremities exam: Positive for: pedal pulses present.  Negative for: calf 

tenderness, pedal edema, tenderness





- Back Exam


Back exam: absent: CVA tenderness (L), CVA tenderness (R), paraspinal tenderness





- Neurological Exam


Neurological exam: Alert, CN II-XII Intact, Oriented x3, Reflexes Normal





- Psychiatric Exam


Psychiatric exam: Normal Mood





- Skin


Skin Exam: Dry, Intact





Results





- Vital Signs


Recent Vital Signs: 


                                Last Vital Signs











Temp  98.4 F   02/10/19 08:00


 


Pulse  93 H  02/10/19 08:00


 


Resp  20   02/10/19 08:00


 


BP  112/64   02/10/19 13:10


 


Pulse Ox  97   02/10/19 11:00














- Labs


Result Diagrams: 


                                 02/12/19 06:24





                                 02/12/19 06:24


Labs: 


                         Laboratory Results - last 24 hr











  02/10/19 02/10/19





  07:18 07:18


 


WBC  18.4 H D 


 


RBC  4.08 


 


Hgb  12.8 


 


Hct  36.9 


 


MCV  90.4 


 


MCH  31.4 H 


 


MCHC  34.7 


 


RDW  12.9 


 


Plt Count  169  D 


 


MPV  8.3 


 


Neut % (Auto)  89.7 H 


 


Lymph % (Auto)  3.4 L 


 


Mono % (Auto)  6.7 


 


Eos % (Auto)  0.1 


 


Baso % (Auto)  0.1 


 


Neut # (Auto)  16.5 H 


 


Lymph # (Auto)  0.6 L 


 


Mono # (Auto)  1.2 H 


 


Eos # (Auto)  0.0 


 


Baso # (Auto)  0.0 


 


Neutrophils % (Manual)  75 


 


Band Neutrophils %  13 H* 


 


Lymphocytes % (Manual)  5 L 


 


Monocytes % (Manual)  7 


 


Toxic Granulation  Present 


 


Platelet Estimate  Normal 


 


Large Platelets  Present 


 


Anisocytosis (manual)  Slight 


 


Sodium   137


 


Potassium   2.6 L


 


Chloride   108 H


 


Carbon Dioxide   22


 


Anion Gap   10


 


BUN   12


 


Creatinine   0.6 L


 


Est GFR ( Amer)   > 60


 


Est GFR (Non-Af Amer)   > 60


 


Random Glucose   95


 


Calcium   7.2 L


 


Total Bilirubin   4.6 H


 


AST   200 H D


 


ALT   556 H D


 


Alkaline Phosphatase   172 H D


 


Total Protein   5.6 L


 


Albumin   3.0 L D


 


Globulin   2.6


 


Albumin/Globulin Ratio   1.1














Assessment & Plan


(1) Abdominal pain


Status: Acute   





- Assessment and Plan (Free Text)


Assessment: 





ascending cholangitis


s/p MRCP


josephine ERCP in am


cont IV antibiotics

## 2019-02-10 NOTE — CP.PCM.PN
Subjective





- Date & Time of Evaluation


Date of Evaluation: 02/10/19


Time of Evaluation: 07:00





- Subjective


Subjective: 


SURGERY PROGRESS NOTE FOR DR. AGUIRRE





Patient seen and examined at bedside. She reports improved abdominal pain but 

still present in bilateral upper abdomen radiating to her flank. Reports nausea 

but no vomiting.





Objective





- Vital Signs/Intake and Output


Vital Signs (last 24 hours): 


                                        











Temp Pulse Resp BP Pulse Ox


 


 98.4 F   93 H  20   110/72   95 


 


 02/10/19 08:00  02/10/19 08:00  02/10/19 08:00  02/10/19 08:00  02/10/19 08:00








Intake and Output: 


                                        











 02/10/19 02/10/19





 06:59 18:59


 


Intake Total 2000 


 


Balance 2000 














- Medications


Medications: 


                               Current Medications





Acetaminophen (Tylenol 325mg Tab)  650 mg PO Q6 PRN


   PRN Reason: Fever >100.4 F


   Last Admin: 02/10/19 02:02 Dose:  650 mg


Enoxaparin Sodium (Lovenox)  [facility]ILY [facility]


   Last Admin: 02/09/19 09:17 Dose:  40 mg


Sodium Chloride (Sodium Chloride 0.9%)  1,000 mls @ 125 mls/hr IV .[facility]8H [facility]


   Last Admin: 02/10/19 06:56 Dose:  Not Given


Influenza Virus Vaccine (Flucelvax Quad [id number] Syr)  60 mcg IM .ONCE ONE


   Stop: 02/12/19 14:01


Morphine Sulfate (Morphine)  2 mg IVP Q4 PRN


   PRN Reason: pain scale 5-9


   Last Admin: 02/09/19 19:13 Dose:  2 mg


Ondansetron HCl (Zofran Inj)  4 mg IVP Q4 PRN


   PRN Reason: Nausea/Vomiting


   Last Admin: 02/10/19 07:07 Dose:  4 mg











- Labs


Labs: 


                                        





                                 02/10/19 07:18 





                                 02/10/19 07:18 





                                        











PT  12.0 SECONDS (9.7-12.2)   02/09/19  11:24    


 


INR  1.1   02/09/19  11:24    


 


APTT  32 SECONDS (21-34)   02/09/19  11:24    














- Constitutional


Appears: Non-toxic, No Acute Distress





- Eye Exam


Eye Exam: EOMI, Normal appearance





- Respiratory Exam


Respiratory Exam: NORMAL BREATHING PATTERN.  absent: Respiratory Distress





- Cardiovascular Exam


Cardiovascular Exam: +S1, +S2





- GI/Abdominal Exam


GI & Abdominal Exam: Soft, Tenderness (in epigastric).  absent: Distended, Firm,

Guarding, Rigid, Rebound





- Neurological Exam


Neurological Exam: Alert, Awake, CN II-XII Intact





- Psychiatric Exam


Psychiatric exam: Normal Affect, Normal Mood





- Skin


Skin Exam: Dry, Erythema





Assessment and Plan





- Assessment and Plan (Free Text)


Assessment: 


34yo F with PMHx of choledocholithiasis s/p ERCP w/ stent in 2014 who presents 

with abdominal pain, CBD stent, rule out cholangitis





- Tmax 100.7 overnight, HR , WBC now increased to 18.4 from 10.3 yesterday


- Bilirubin increased to 4.6 from 1.5 yesterday, LFTs remain elevated but tr

ending down


- Recommend ERCP, concern for cholangitis


- Discussed with Dr. Mayra Alfonso PGY-4

## 2019-02-11 NOTE — HP
HISTORY OF PRESENT ILLNESS:  The patient is a 33-year-old female admitted

to the hospital with abdominal pain, weakness, fatigue, and tiredness.  The

patient has elevated liver enzymes.



PHYSICAL EXAMINATION:

GENERAL:  The patient is awake, alert and oriented.

VITAL SIGNS:  Temperature is 98, pulse is 90.

HEENT:  Within normal limits.

NECK:  Supple.

CHEST:  Symmetrical.

HEART:  Regular.

ABDOMEN:  Tenderness in ______ abdomen.

EXTREMITIES:  No edema.



IMPRESSION AND PLAN:  The patient is to rule out cholecystitis.  The

patient is on bedrest.  Supportive care.  IV fluids.







__________________________________________

Armin Aviles MD





DD:  02/09/2019 18:40:26

DT:  02/10/2019 1:23:08

Job # [id number]

## 2019-02-11 NOTE — CP.PCM.PN
Subjective





- Date & Time of Evaluation


Date of Evaluation: 02/11/19


Time of Evaluation: 07:00





- Subjective


Subjective: 


SURGERY PROGRESS NOTE FOR DR. AGUIRRE





Patient seen and examined at bedside. She reports improved abdominal pain but 

still present in epigastric area. Denies nausea or vomiting.





Objective





- Vital Signs/Intake and Output


Vital Signs (last 24 hours): 


                                        











Temp Pulse Resp BP Pulse Ox


 


 98.2 F   96 H  19   129/78   98 


 


 02/11/19 07:00  02/11/19 11:34  02/11/19 11:34  02/11/19 11:34  02/11/19 11:34








Intake and Output: 


                                        











 02/11/19 02/11/19





 06:59 18:59


 


Intake Total 2075 


 


Balance 2075 














- Medications


Medications: 


                               Current Medications





Acetaminophen (Tylenol 325mg Tab)  650 mg PO Q6 PRN


   PRN Reason: Pain, Mild (1-3)


Enoxaparin Sodium (Lovenox)  40 mg SC DAILY [facility]


   Last Admin: 02/11/19 09:42 Dose:  40 mg


Sodium Chloride (Sodium Chloride 0.9%)  1,000 mls @ 125 mls/hr IV .Q8H [facility]


   Last Admin: 02/11/19 05:00 Dose:  125 mls/hr


Metronidazole (Flagyl)  500 mg in 100 mls @ 100 mls/hr IVPB Q8H [facility]; Protocol


   Last Admin: 02/11/19 01:06 Dose:  100 mls/hr


Meropenem 1 gm/ Sodium (Chloride)  100 mls @ 100 mls/hr IVPB Q8H [facility]; Protocol


   Last Admin: 02/11/19 06:36 Dose:  100 mls/hr


Potassium Chloride (Potassium Chloride 20 Meq/100 Ml)  20 meq in 100 mls @ 50 

mls/hr IVPB ONCE ONE


   Stop: 02/11/19 13:29


Potassium Chloride (Potassium Chloride 20 Meq/100 Ml)  20 meq in 100 mls @ 50 

mls/hr IVPB ONCE ONE


   Stop: 02/11/19 14:59


Influenza Virus Vaccine (Flucelvax Quad 0261-7539 Syr)  60 mcg IM .ONCE ONE


   Stop: 02/12/19 14:01


Morphine Sulfate (Morphine)  2 mg IVP Q4 PRN


   PRN Reason: pain scale 5-9


   Last Admin: 02/10/19 13:04 Dose:  2 mg


Ondansetron HCl (Zofran Inj)  4 mg IVP Q4 PRN


   PRN Reason: Nausea/Vomiting


   Last Admin: 02/10/19 21:31 Dose:  4 mg


Potassium Chloride (K-Dur 20 Meq Er Tab)  20 meq PO DAILY [facility]


   Last Admin: 02/11/19 11:06 Dose:  Not Given











- Labs


Labs: 


                                        





                                 02/11/19 07:24 





                                 02/11/19 07:24 





                                        











PT  12.0 SECONDS (9.7-12.2)   02/09/19  11:24    


 


INR  1.1   02/09/19  11:24    


 


APTT  32 SECONDS (21-34)   02/09/19  11:24    














- Constitutional


Appears: Non-toxic, No Acute Distress





- Head Exam


Head Exam: ATRAUMATIC, NORMAL INSPECTION





- Eye Exam


Eye Exam: EOMI, Normal appearance





- Respiratory Exam


Respiratory Exam: NORMAL BREATHING PATTERN.  absent: Respiratory Distress





- Cardiovascular Exam


Cardiovascular Exam: +S1, +S2





- GI/Abdominal Exam


GI & Abdominal Exam: Soft, Tenderness (mild epigastric).  absent: Distended, 

Firm, Guarding, Rigid, Rebound





- Neurological Exam


Neurological Exam: Alert, Awake, Oriented x3





- Psychiatric Exam


Psychiatric exam: Normal Affect, Normal Mood





- Skin


Skin Exam: Dry, Warm





Assessment and Plan





- Assessment and Plan (Free Text)


Assessment: 


32yo F with PMHx of choledocholithiasis s/p ERCP w/ stent in 2014 who presents 

with abdominal pain, CBD stent was found to have choledocholithiasis, rule out 

cholangitis





- MRCP: 8mm stone in distal CBD, CBD dilated to 12mm


- Bilirubin 3.3 from 4.6 yesterday, LFTs remain elevated but trending down


- Pt going for ERCP today with GI


- Discussed with Dr. Mayra Alfonso PGY-4

## 2019-02-11 NOTE — CP.PCM.PN
Subjective





- Date & Time of Evaluation


Date of Evaluation: 02/11/19


Time of Evaluation: 07:38





- Subjective


Subjective: 





Dr. Aviles Service


33 year old female with presented to the emergency department with diffuse 

abdominal pain for close to a week in duration. Patient described it as sharp in

nature with radiation to her back. Patient reports the pain being most severe in

the right upper quadrant.  Patient also had episode of emesis and blood bowel mo

vements when symptoms initially began.  Patient seen and examined at bedside. 

Per nursing no acute events occurred overnight .Patient does report a headache 

during today's visit. Patient denies any chest pain, shortness of breath, 

fevers, chills, syncopal episodes, or any other complaints.





________





Past medical history: choledocholithiasis s/p ercp


Surgical history: ercp


Allergies: Denies


Social history: Denies alcohol or tobacco use. Denies illicit drug use.





Objective





- Vital Signs/Intake and Output


Vital Signs (last 24 hours): 


                                        











Temp Pulse Resp BP Pulse Ox


 


 98.3 F   79   20   100/66   96 


 


 02/11/19 04:12  02/11/19 04:12  02/11/19 04:12  02/11/19 04:12  02/11/19 04:12








Intake and Output: 


                                        











 02/11/19 02/11/19





 06:59 18:59


 


Intake Total 2075 


 


Balance 2075 














- Medications


Medications: 


                               Current Medications





Acetaminophen (Tylenol 325mg Tab)  650 mg PO Q6 PRN


   PRN Reason: Pain, Mild (1-3)


Enoxaparin Sodium (Lovenox)  40 mg SC DAILY [facility]


   Last Admin: 02/10/19 09:41 Dose:  40 mg


Sodium Chloride (Sodium Chloride 0.9%)  1,000 mls @ 125 mls/hr IV .Q8H [facility]


   Last Admin: 02/11/19 05:00 Dose:  125 mls/hr


Metronidazole (Flagyl)  500 mg in 100 mls @ 100 mls/hr IVPB Q8H [facility]; Protocol


   Last Admin: 02/11/19 01:06 Dose:  100 mls/hr


Meropenem 1 gm/ Sodium (Chloride)  100 mls @ 100 mls/hr IVPB Q8H [facility]; Protocol


   Last Admin: 02/11/19 06:36 Dose:  100 mls/hr


Influenza Virus Vaccine (Flucelvax Quad [id number] Syr)  60 mcg IM .ONCE ONE


   Stop: 02/12/19 14:01


Morphine Sulfate (Morphine)  2 mg IVP Q4 PRN


   PRN Reason: pain scale 5-9


   Last Admin: 02/10/19 13:04 Dose:  2 mg


Ondansetron HCl (Zofran Inj)  4 mg IVP Q4 PRN


   PRN Reason: Nausea/Vomiting


   Last Admin: 02/10/19 21:31 Dose:  4 mg











- Labs


Labs: 


                                        





                                 02/10/19 07:18 





                                 02/10/19 07:18 





                                        











PT  12.0 SECONDS (9.7-12.2)   02/09/19  11:24    


 


INR  1.1   02/09/19  11:24    


 


APTT  32 SECONDS (21-34)   02/09/19  11:24    














- Head Exam


Head Exam: ATRAUMATIC, NORMAL INSPECTION





- Eye Exam


Eye Exam: EOMI, Normal appearance, PERRL


Pupil Exam: NORMAL ACCOMODATION





- ENT Exam


ENT Exam: Mucous Membranes Moist, Normal Oropharynx





- Neck Exam


Neck Exam: Normal Inspection





- Respiratory Exam


Respiratory Exam: Clear to Ausculation Bilateral, NORMAL BREATHING PATTERN.  

absent: Respiratory Distress





- Cardiovascular Exam


Cardiovascular Exam: REGULAR RHYTHM, +S1, +S2





- GI/Abdominal Exam


GI & Abdominal Exam: Soft, Normal Bowel Sounds.  absent: Hyperactive Bowel 

Sounds





- Neurological Exam


Neurological Exam: Alert, Awake, Normal Gait, Oriented x3





- Psychiatric Exam


Psychiatric exam: Normal Affect, Normal Mood





- Skin


Skin Exam: Dry, Intact, Normal Color





Assessment and Plan





- Assessment and Plan (Free Text)


Assessment: 





33 year old female with a past medical history of choledocholithiasis s/p ERCP 

in 2014 presents to the hospital with abdominal pain.


Plan: 





1.Abdominal pain


Abdominal ct:


   Colonic wall thickening/mucosal edema, possible colitis, ovarian cyst


Pelvic u/s:


   left ovarian cyst


Surgery consulted. Help appreciated


   :Patient will need CBD exploration by heaptobiliary per Surgery team.


Hepatobiliary  Dr. Orozco consulted. Help appreciated.


   Medications:


   Flagyl 500 mg


   Morphine 2 mg IVP Q4 PRN








2. Transaminitis


AST-104


ALT-378


Hepatitis Panel negative


HIV negative





3.Hypokalemia


-Replete as needed.








PPX


-Lovenox





All management per Dr. Stefan Car, PGY-2

## 2019-02-12 NOTE — CP.PCM.PN
Subjective





- Date & Time of Evaluation


Date of Evaluation: 02/12/19


Time of Evaluation: 08:00





- Subjective


Subjective: 


32 yo female post laparoscpic cholecystectomy 2-3 years ago.  


Presented with cholangitis, retained plastic stent,and CBD stone. 


ERCP performed, plastic stent removed, however stone could not be extracted.








IV rx renewed   


went for MRI





Objective





- Vital Signs/Intake and Output


Vital Signs (last 24 hours): 


                                        











Temp Pulse Resp BP Pulse Ox


 


 98.9 F   54 L  18   117/79   98 


 


 02/12/19 15:00  02/12/19 15:00  02/12/19 15:00  02/12/19 15:00  02/12/19 16:00








Intake and Output: 


                                        











 02/12/19 02/12/19





 06:59 18:59


 


Intake Total 975 


 


Balance 975 














- Medications


Medications: 


                               Current Medications





Acetaminophen (Tylenol 325mg Tab)  650 mg PO Q6 PRN


   PRN Reason: Pain, Mild (1-3)


Enoxaparin Sodium (Lovenox)  40 mg SC DAILY [facility]


   Last Admin: 02/11/19 09:42 Dose:  40 mg


Metronidazole (Flagyl)  500 mg in 100 mls @ 100 mls/hr IVPB Q8H [facility]; Protocol


   Last Admin: 02/12/19 12:57 Dose:  100 mls/hr


Meropenem 1 gm/ Sodium (Chloride)  100 mls @ 100 mls/hr IVPB Q8H [facility]; Protocol


   Last Admin: 02/12/19 14:51 Dose:  100 mls/hr


Lactated Ringer's (Lactated Ringer's)  1,000 mls @ 125 mls/hr IV .Q8H [facility]


   Last Admin: 02/12/19 09:13 Dose:  125 mls/hr


Morphine Sulfate (Morphine)  2 mg IVP Q4 PRN


   PRN Reason: pain scale 5-9


   Last Admin: 02/10/19 13:04 Dose:  2 mg


Ondansetron HCl (Zofran Inj)  4 mg IVP Q4 PRN


   PRN Reason: Nausea/Vomiting


   Last Admin: 02/12/19 16:40 Dose:  4 mg


Potassium Chloride (K-Dur 20 Meq Er Tab)  20 meq PO DAILY [facility]


   Last Admin: 02/12/19 10:00 Dose:  Not Given











- Labs


Labs: 


                                        





                                 02/12/19 06:24 





                                 02/12/19 06:24 





                                        











PT  12.0 SECONDS (9.7-12.2)   02/09/19  11:24    


 


INR  1.1   02/09/19  11:24    


 


APTT  32 SECONDS (21-34)   02/09/19  11:24    














- Constitutional


Appears: Non-toxic, Chronically Ill





- Head Exam


Head Exam: NORMOCEPHALIC





- Eye Exam


Eye Exam: absent: Scleral icterus





- ENT Exam


ENT Exam: Mucous Membranes Dry





- Neck Exam


Neck Exam: absent: Lymphadenopathy, Thyromegaly





- Respiratory Exam


Respiratory Exam: Decreased Breath Sounds





- Cardiovascular Exam


Cardiovascular Exam: REGULAR RHYTHM





- GI/Abdominal Exam


GI & Abdominal Exam: Distended, Soft





- Rectal Exam


Rectal Exam: Deferred





-  Exam


 Exam: NORMAL INSPECTION





Assessment and Plan


(1) Abdominal pain


Status: Acute   





- Assessment and Plan (Free Text)


Assessment: 





doing better 


c/o abd pain 


NAD

## 2019-02-12 NOTE — RAD
PROCEDURE:  



HISTORY:

As above



COMPARISON:

None



TECHNIQUE:

Total fluoroscopic time utilized during the procedure: 99.6 seconds  

;  0.37475 mGy cm 2



FINDINGS:

Submitted images from the current procedure: 8



Please refer to the  physician's notes performing the procedure. 



IMPRESSION:

Less than 1 hour fluoroscopic time utilized during performance of the 

procedure

## 2019-02-12 NOTE — CP.PCM.PN
Subjective





- Date & Time of Evaluation


Date of Evaluation: 02/12/19


Time of Evaluation: 16:15





- Subjective


Subjective: 














Progress note.














Attending: Dr. Aviles

















Pt seen and examined at bedside. No acute distress. NPO, some vomiting this 

morning, along with a little headache. Surgery following. 





Objective





- Vital Signs/Intake and Output


Vital Signs (last 24 hours): 


                                        











Temp Pulse Resp BP Pulse Ox


 


 98.6 F   52 L  18   118/76   98 


 


 02/12/19 08:00  02/12/19 08:00  02/12/19 08:00  02/12/19 08:00  02/12/19 08:00








Intake and Output: 


                                        











 02/12/19 02/12/19





 06:59 18:59


 


Intake Total 975 


 


Balance 975 














- Medications


Medications: 


                               Current Medications





Acetaminophen (Tylenol 325mg Tab)  650 mg PO Q6 PRN


   PRN Reason: Pain, Mild (1-3)


Enoxaparin Sodium (Lovenox)  40 mg SC DAILY [facility]


   Last Admin: 02/11/19 09:42 Dose:  40 mg


Metronidazole (Flagyl)  500 mg in 100 mls @ 100 mls/hr IVPB Q8H [facility]; Protocol


   Last Admin: 02/12/19 12:57 Dose:  100 mls/hr


Meropenem 1 gm/ Sodium (Chloride)  100 mls @ 100 mls/hr IVPB Q8H [facility]; Protocol


   Last Admin: 02/12/19 14:51 Dose:  100 mls/hr


Lactated Ringer's (Lactated Ringer's)  1,000 mls @ 125 mls/hr IV .Q8H [facility]


   Last Admin: 02/12/19 09:13 Dose:  125 mls/hr


Morphine Sulfate (Morphine)  2 mg IVP Q4 PRN


   PRN Reason: pain scale 5-9


   Last Admin: 02/10/19 13:04 Dose:  2 mg


Ondansetron HCl (Zofran Inj)  4 mg IVP Q4 PRN


   PRN Reason: Nausea/Vomiting


   Last Admin: 02/12/19 13:37 Dose:  4 mg


Potassium Chloride (K-Dur 20 Meq Er Tab)  20 meq PO DAILY [facility]


   Last Admin: 02/12/19 10:00 Dose:  Not Given











- Labs


Labs: 


                                        





                                 02/12/19 06:24 





                                 02/12/19 06:24 





                                        











PT  12.0 SECONDS (9.7-12.2)   02/09/19  11:24    


 


INR  1.1   02/09/19  11:24    


 


APTT  32 SECONDS (21-34)   02/09/19  11:24    














- Constitutional


Appears: Non-toxic, No Acute Distress





- Head Exam


Head Exam: ATRAUMATIC, NORMAL INSPECTION, NORMOCEPHALIC





- Eye Exam


Eye Exam: EOMI





- ENT Exam


ENT Exam: Mucous Membranes Moist





- Respiratory Exam


Respiratory Exam: NORMAL BREATHING PATTERN.  absent: Respiratory Distress





- Cardiovascular Exam


Cardiovascular Exam: +S1, +S2





- GI/Abdominal Exam


GI & Abdominal Exam: Soft, Normal Bowel Sounds.  absent: Tenderness





- Extremities Exam


Extremities Exam: Full ROM, Normal Inspection





- Back Exam


Back Exam: NORMAL INSPECTION





- Neurological Exam


Neurological Exam: Alert, Awake, Oriented x3





- Psychiatric Exam


Psychiatric exam: Flat Affect





- Skin


Skin Exam: Dry, Intact, Normal Color, Warm





Assessment and Plan





- Assessment and Plan (Free Text)


Assessment: 











33 year old female with a past medical history of choledocholithiasis s/p ERCP 

in 2014 presents to the hospital with abdominal pain.








1.Abdominal pain





Abdominal ct:


   Colonic wall thickening/mucosal edema, possible colitis, ovarian cyst


Pelvic u/s:


   left ovarian cyst


Surgery consulted. Help appreciated


   :Patient will need CBD exploration by hepatobiliary surgeon





Hepatobiliary  Dr. Orozco consulted. Help appreciated. Plan for either 

spyglass or lithotripsy. If not, laparoscopic cbd exploration


   Medications:


   Flagyl 500 mg


   Morphine 2 mg IVP Q4 PRN








2. Transaminitis








trending down 


Hepatitis Panel negative


HIV negative


likely multifactorial in etiology 





3.Hypokalemia


-Replete as needed.








4. GI/DVT ppx 








-Lovenox





All management per Dr. Aviles

## 2019-02-12 NOTE — CP.PCM.CON
History of Present Illness





- History of Present Illness


History of Present Illness: 





32 yo female post laparoscpic cholecystectomy 2-3 years ago.  Presented with 

cholangitis, retained plastic stent,and CBD stone. ERCP performed, plastic stent

removed, however stone could not be extracted.  





Review of Systems





- Review of Systems


All systems: reviewed and no additional remarkable complaints except





- Gastrointestinal


Gastrointestinal: Abdominal Pain


Additional comments: 





mild abdominal discomfort





Past Patient History





- Past Medical History & Family History


Past Medical History?: No





- Past Social History


Smoking Status: Never Smoked





- MUSCULOSKELETAL/RHEUMATOLOGICAL


Hx Falls: No





- PSYCHIATRIC


Hx Substance Use: No





- SURGICAL HISTORY


Hx Cholecystectomy: Yes





- ANESTHESIA


Hx Anesthesia: No


Hx Anesthesia Reactions: No


Hx Malignant Hyperthermia: No





Meds


Allergies/Adverse Reactions: 


                                    Allergies











Allergy/AdvReac Type Severity Reaction Status Date / Time


 


No Known Allergies Allergy   Verified 02/07/19 09:34














- Medications


Medications: 


                               Current Medications





Acetaminophen (Tylenol 325mg Tab)  650 mg PO Q6 PRN


   PRN Reason: Pain, Mild (1-3)


Enoxaparin Sodium (Lovenox)  40 mg SC DAILY [facility]


   Last Admin: 02/11/19 09:42 Dose:  40 mg


Metronidazole (Flagyl)  500 mg in 100 mls @ 100 mls/hr IVPB Q8H [facility]; Protocol


   Last Admin: 02/12/19 01:42 Dose:  100 mls/hr


Meropenem 1 gm/ Sodium (Chloride)  100 mls @ 100 mls/hr IVPB Q8H [facility]; Protocol


   Last Admin: 02/12/19 06:31 Dose:  100 mls/hr


Influenza Virus Vaccine (Flucelvax Quad [id number] Syr)  60 mcg IM .ONCE ONE


   Stop: 02/12/19 14:01


Morphine Sulfate (Morphine)  2 mg IVP Q4 PRN


   PRN Reason: pain scale 5-9


   Last Admin: 02/10/19 13:04 Dose:  2 mg


Ondansetron HCl (Zofran Inj)  4 mg IVP Q4 PRN


   PRN Reason: Nausea/Vomiting


   Last Admin: 02/12/19 04:31 Dose:  4 mg


Potassium Chloride (K-Dur 20 Meq Er Tab)  20 meq PO DAILY [facility]


   Last Admin: 02/11/19 11:06 Dose:  Not Given











Physical Exam





- Constitutional


Additional comments: 





mild discomfort





- Head Exam


Head Exam: NORMAL INSPECTION





- Eye Exam


Eye Exam: EOMI, Normal appearance, PERRL





- Respiratory Exam


Respiratory Exam: Clear to Auscultation Bilateral, NORMAL BREATHING PATTERN





- Cardiovascular Exam


Cardiovascular Exam: REGULAR RHYTHM





- GI/Abdominal Exam


GI & Abdominal Exam: Normal Bowel Sounds, Soft, Tenderness


Additional comments: 


mild tenderness








- Rectal Exam


Rectal Exam: Deferred





- Extremities Exam


Extremities exam: Positive for: normal inspection





- Neurological Exam


Neurological exam: Alert, CN II-XII Intact, Normal Gait, Oriented x3, Reflexes 

Normal





Results





- Vital Signs


Recent Vital Signs: 


                                Last Vital Signs











Temp  98.7 F   02/11/19 23:19


 


Pulse  80   02/11/19 23:19


 


Resp  20   02/11/19 23:19


 


BP  116/78   02/11/19 23:19


 


Pulse Ox  97   02/11/19 23:19














- Labs


Result Diagrams: 


                                 02/12/19 06:24





                                 02/12/19 06:24


Labs: 


                         Laboratory Results - last 24 hr











  02/10/19 02/10/19 02/11/19





  15:25 16:49 07:24


 


WBC   


 


RBC   


 


Hgb   


 


Hct   


 


MCV   


 


MCH   


 


MCHC   


 


RDW   


 


Plt Count   


 


MPV   


 


Neut % (Auto)   


 


Lymph % (Auto)   


 


Mono % (Auto)   


 


Eos % (Auto)   


 


Baso % (Auto)   


 


Neut # (Auto)   


 


Lymph # (Auto)   


 


Mono # (Auto)   


 


Eos # (Auto)   


 


Baso # (Auto)   


 


Neutrophils % (Manual)    76 H


 


Band Neutrophils %    12 H*


 


Lymphocytes % (Manual)    5 L


 


Monocytes % (Manual)    5


 


Eosinophils % (Manual)    2


 


Platelet Estimate    Normal


 


Large Platelets    Present


 


RBC Morphology    Normal


 


Sodium   


 


Potassium   


 


Chloride   


 


Carbon Dioxide   


 


Anion Gap   


 


BUN   


 


Creatinine   


 


Est GFR ( Amer)   


 


Est GFR (Non-Af Amer)   


 


Random Glucose   


 


Calcium   


 


Magnesium   


 


Total Bilirubin   


 


AST   


 


ALT   


 


Alkaline Phosphatase   


 


Total Protein   


 


Albumin   


 


Globulin   


 


Albumin/Globulin Ratio   


 


Stool Leukocytes, Qual  Negative  


 


HIV 1&2 Antibody Screen   Negative 














  02/11/19 02/12/19 02/12/19





  07:24 06:24 06:24


 


WBC   8.9 


 


RBC   3.67 L 


 


Hgb   11.7 


 


Hct   33.2 L 


 


MCV   90.5 


 


MCH   31.9 H 


 


MCHC   35.3 


 


RDW   13.2 


 


Plt Count   148 


 


MPV   8.2 


 


Neut % (Auto)   74.2 


 


Lymph % (Auto)   14.7 L 


 


Mono % (Auto)   9.3 


 


Eos % (Auto)   1.4 


 


Baso % (Auto)   0.4 


 


Neut # (Auto)   6.6 


 


Lymph # (Auto)   1.3 


 


Mono # (Auto)   0.8 


 


Eos # (Auto)   0.1 


 


Baso # (Auto)   0.0 


 


Neutrophils % (Manual)   


 


Band Neutrophils %   


 


Lymphocytes % (Manual)   


 


Monocytes % (Manual)   


 


Eosinophils % (Manual)   


 


Platelet Estimate   


 


Large Platelets   


 


RBC Morphology   


 


Sodium  139   139


 


Potassium  3.0 L   3.2 L


 


Chloride  109 H   113 H


 


Carbon Dioxide  22   20 L


 


Anion Gap  11   9 L


 


BUN  9   9


 


Creatinine  0.6 L   0.5 L


 


Est GFR ( Amer)  > 60   > 60


 


Est GFR (Non-Af Amer)  > 60   > 60


 


Random Glucose  73  D   85


 


Calcium  7.8 L   8.0 L


 


Magnesium  2.2  


 


Total Bilirubin  3.3 H   2.5 H


 


AST  104 H D   57 H D


 


ALT  398 H D   271 H D


 


Alkaline Phosphatase  187 H   185 H


 


Total Protein  6.1 L   5.5 L


 


Albumin  3.3 L   2.9 L


 


Globulin  2.8   2.5


 


Albumin/Globulin Ratio  1.2   1.2


 


Stool Leukocytes, Qual   


 


HIV 1&2 Antibody Screen   














- Imaging and Cardiology


  ** MRI - abdomen


Additional comment: 





MRCP showed 2 stones just over a CM in size 





Assessment & Plan





- Assessment and Plan (Free Text)


Assessment: 





33 year old female post lap cholecystectomy with common ducts stones.  May 

benefit from spyglass/lithotripsy.  If not available will schedule for laparos

copic CBD exploration.





- Date & Time


Date: 02/12/19


Time: 08:22

## 2019-02-13 NOTE — CP.PCM.PN
Subjective





- Date & Time of Evaluation


Date of Evaluation: 02/13/19


Time of Evaluation: 09:00





- Subjective


Subjective: 





less pain


no fever


no jaundice 





Objective





- Vital Signs/Intake and Output


Vital Signs (last 24 hours): 


                                        











Temp Pulse Resp BP Pulse Ox


 


 98.8 F   58 L  20   126/77   96 


 


 02/13/19 07:00  02/13/19 07:00  02/13/19 07:00  02/13/19 07:00  02/13/19 07:00











- Medications


Medications: 


                               Current Medications





Acetaminophen (Tylenol 325mg Tab)  650 mg PO Q6 PRN


   PRN Reason: Pain, Mild (1-3)


Enoxaparin Sodium (Lovenox)  40 mg SC DAILY [facility]


   Last Admin: 02/11/19 09:42 Dose:  40 mg


Meropenem 1 gm/ Sodium (Chloride)  100 mls @ 100 mls/hr IVPB Q8H [facility]; Protocol


   Last Admin: 02/13/19 06:33 Dose:  100 mls/hr


Lactated Ringer's (Lactated Ringer's)  1,000 mls @ 125 mls/hr IV .Q8H [facility]


   Last Admin: 02/13/19 08:45 Dose:  Not Given


Metronidazole (Flagyl)  500 mg in 100 mls @ 100 mls/hr IVPB Q8H [facility]; Protocol


Ondansetron HCl (Zofran Inj)  4 mg IVP Q4 PRN


   PRN Reason: Nausea/Vomiting


   Last Admin: 02/13/19 14:18 Dose:  4 mg


Potassium Chloride (K-Dur 20 Meq Er Tab)  20 meq PO DAILY [facility]


   Last Admin: 02/13/19 10:02 Dose:  20 meq











- Labs


Labs: 


                                        





                                 02/13/19 07:35 





                                 02/13/19 07:35 





                                        











PT  12.0 SECONDS (9.7-12.2)   02/09/19  11:24    


 


INR  1.1   02/09/19  11:24    


 


APTT  32 SECONDS (21-34)   02/09/19  11:24    














- Constitutional


Appears: Well





- Head Exam


Head Exam: ATRAUMATIC, NORMAL INSPECTION, NORMOCEPHALIC





- Eye Exam


Eye Exam: EOMI, Normal appearance, PERRL


Pupil Exam: NORMAL ACCOMODATION, PERRL





- ENT Exam


ENT Exam: Mucous Membranes Moist, Normal Exam





- Neck Exam


Neck Exam: Full ROM, Normal Inspection.  absent: Lymphadenopathy





- Respiratory Exam


Respiratory Exam: Clear to Ausculation Bilateral, NORMAL BREATHING PATTERN





- Cardiovascular Exam


Cardiovascular Exam: REGULAR RHYTHM, +S1, +S2.  absent: Murmur





- GI/Abdominal Exam


GI & Abdominal Exam: Soft, Normal Bowel Sounds.  absent: Tenderness





- Rectal Exam


Rectal Exam: Deferred





-  Exam


 Exam: NORMAL INSPECTION





- Extremities Exam


Extremities Exam: Full ROM, Normal Capillary Refill, Normal Inspection.  absent:

Joint Swelling, Pedal Edema





- Back Exam


Back Exam: NORMAL INSPECTION





- Neurological Exam


Neurological Exam: Alert, Awake, CN II-XII Intact, Normal Gait, Oriented x3





- Psychiatric Exam


Psychiatric exam: Normal Affect, Normal Mood





- Skin


Skin Exam: Dry, Intact, Normal Color, Warm





Assessment and Plan


(1) Abdominal pain


Status: Acute   





(2) Ascending cholangitis


Status: Acute   





- Assessment and Plan (Free Text)


Assessment: 





cont rx as ordered

## 2019-02-13 NOTE — MRI
MRI abdomen without/with IV contrast 



MRCP 



Indication: possible CBD stone, cholangitis, hx of CBD stent



Technique: Multiplanar, multi sequence magnetic resonance images of 

the abdomen were obtained without and with the administration of 

intravenous gadolinium using a multi phase abdomen protocol. Rotating 

maximum intensity projection images of the biliary system were 

generated. A total of 1029 images submitted for review 



Comparison: CT abdomen and pelvis with contrast performed 2/7/19, 

abdominal ultrasound performed 12/27/16, MRCP/abdomen with/without IV 

contrast performed 11/18/14 



Findings: 



Examination limited by respiratory motion artifact. 



Cholecystectomy. 6 x 8 mm and 8 x 10 mm filling defect within the mid 

to distal common bile duct.  The common bile duct appears dilated 

measuring up to approximately 9 mm in diameter.  There is no 

intrahepatic biliary ductal dilatation. The pancreatic duct appears 

within normal limits of caliber. 



CBD stent seen to better advantage on CT abdomen pelvis with contrast 

performed 2/7/19. 



The liver, spleen, pancreas, and adrenal glands appear unremarkable.  

The kidneys enhance symmetrically. No hydronephrosis or obstructing 

calculus identified. No bulky adenopathy appreciated. 



Limited views of the inferior thorax demonstrates small bilateral 

pleural effusions.  Left basilar atelectasis. 



Impression: 



Examination limited by respiratory motion artifact. 



6 x 8 mm and 8 x 10 mm filling defects within the mid to distal 

common bile duct. The common bile duct appears dilated measuring up 

to approximately 9 mm in diameter. 



Cholecystectomy. 



CBD stent seen to better advantage on CT abdomen pelvis with contrast 

performed 2/7/19. 



Small bilateral pleural effusions. Left basilar atelectasis. 



Preliminary impression was provided by [facility]. Findings discussed 

with CARLOS A Watson on 2/13/19 at 10:21 a.m.

## 2019-02-13 NOTE — CP.PCM.PN
Subjective





- Date & Time of Evaluation


Date of Evaluation: 02/13/19


Time of Evaluation: 06:56





- Subjective


Subjective: 





Progress note: Dr. Aviles Service








Pt seen and examined at bedside. No acute distress. Patient reports an 

improvement in headache symptoms. Patient also still reports some abdominal 

discomfort but it is improving. Patient denies any chest pain, shortness of 

breath, fevers, chills, syncopal episodes, or any other complaints.








Objective





- Vital Signs/Intake and Output


Vital Signs (last 24 hours): 


                                        











Temp Pulse Resp BP Pulse Ox


 


 99 F   77   20   126/80   96 


 


 02/13/19 04:45  02/13/19 04:45  02/13/19 04:45  02/13/19 04:45  02/13/19 04:45











- Medications


Medications: 


                               Current Medications





Acetaminophen (Tylenol 325mg Tab)  650 mg PO Q6 PRN


   PRN Reason: Pain, Mild (1-3)


Enoxaparin Sodium (Lovenox)  40 mg SC DAILY [facility]


   Last Admin: 02/11/19 09:42 Dose:  40 mg


Metronidazole (Flagyl)  500 mg in 100 mls @ 100 mls/hr IVPB Q8H [facility]; Protocol


   Last Admin: 02/13/19 01:20 Dose:  100 mls/hr


Meropenem 1 gm/ Sodium (Chloride)  100 mls @ 100 mls/hr IVPB Q8H [facility]; Protocol


   Last Admin: 02/13/19 06:33 Dose:  100 mls/hr


Lactated Ringer's (Lactated Ringer's)  1,000 mls @ 125 mls/hr IV .Q8H [facility]


   Last Admin: 02/13/19 06:34 Dose:  125 mls/hr


Morphine Sulfate (Morphine)  2 mg IVP Q4 PRN


   PRN Reason: pain scale 5-9


   Last Admin: 02/12/19 20:39 Dose:  2 mg


Ondansetron HCl (Zofran Inj)  4 mg IVP Q4 PRN


   PRN Reason: Nausea/Vomiting


   Last Admin: 02/13/19 06:26 Dose:  4 mg


Potassium Chloride (K-Dur 20 Meq Er Tab)  20 meq PO DAILY [facility]


   Last Admin: 02/12/19 10:00 Dose:  Not Given











- Labs


Labs: 


                                        





                                 02/12/19 06:24 





                                 02/12/19 06:24 





                                        











PT  12.0 SECONDS (9.7-12.2)   02/09/19  11:24    


 


INR  1.1   02/09/19  11:24    


 


APTT  32 SECONDS (21-34)   02/09/19  11:24    














- Head Exam


Head Exam: ATRAUMATIC, NORMAL INSPECTION





- Eye Exam


Eye Exam: EOMI, Normal appearance, PERRL


Pupil Exam: NORMAL ACCOMODATION





- ENT Exam


ENT Exam: Mucous Membranes Moist, Normal Oropharynx





- Respiratory Exam


Respiratory Exam: Clear to Ausculation Bilateral, NORMAL BREATHING PATTERN.  

absent: Prolonged Expiratory Phase, Respiratory Distress





- Cardiovascular Exam


Cardiovascular Exam: REGULAR RHYTHM, RRR, +S1, +S2.  absent: Rubs





- GI/Abdominal Exam


GI & Abdominal Exam: Soft, Normal Bowel Sounds





- Extremities Exam


Extremities Exam: Full ROM, Normal Inspection.  absent: Pedal Edema





- Back Exam


Back Exam: NORMAL INSPECTION.  absent: CVA tenderness (R), paraspinal tenderness





- Neurological Exam


Neurological Exam: Alert, Awake, Oriented x3





- Psychiatric Exam


Psychiatric exam: Normal Affect, Normal Mood.  absent: Depressed





- Skin


Skin Exam: Dry, Intact





Assessment and Plan





- Assessment and Plan (Free Text)


Assessment: 








33 year old female with a past medical history of choledocholithiasis s/p ERCP 

in 2014 presents to the hospital with abdominal pain.


Plan: 





1.Abdominal pain


Abdominal ct:


   Colonic wall thickening/mucosal edema, possible colitis, ovarian cyst


Pelvic u/s:


   left ovarian cyst


MRCP done:


   :6x8mm and 8x10mm defects within the mid to distal common bile duct. The 

common bile duct appears dilated measuring up to 9mm in diameter


   :Cholecystectomy


   :CBD stent seen to better advantage on CT abdomen with pelvis 


   :small bilateral pleural effusions. left basilar atelectasis


Repeat MRCP done (2/12/19):


   :6x8 & 8x10mm filling defect within mid to distal CBD, CBD diameter 9mm


   : May need repeat ERCP with endoscopic removal of CBD stones


Surgery consulted. Help appreciated


   :Patient will need CBD exploration by heaptobiliary per Surgery team.


Hepatobiliary  Dr. Orozco consulted. Help appreciated.


   :MRCP showed 2 stones just over a CM in size 


   :33 year old female post lap cholecystectomy with common ducts stones.  May 

benefit from spyglass/lithotripsy.  If not available will schedule for          

laparoscopic CBD exploration.


   :Procedure cancelled after being re-evaluated by Dr. Ernesto.  Patient had 

repeat MRCP done as stated above and found a stone still present in the CBD. 

Patient will most likely need ERCP for removal per Surgery team.


Total bilirubin trending down.


   Medications:


   Meropenem 1gm IVPB Q8 [facility] (Start Date:2/10/19)


   Flagyl 500 mg IVPB Q8 [facility] (Start Date:2/13/19)


   Morphine 2 mg IVP Q4 PRN


   Zofran 4mg IVP Q4 PRN








2. Transaminitis


AST-57


ALT-271


Trending down.


Hepatitis Panel negative


HIV negative





3.Hypokalemia


-Replete as needed.








PPX


-Lovenox 40 mg SC DAILY [facility]


-Lactated Ringers@125mls/ hr








Dispo: Repeat MRCP showed stoned in CBD, per surgery.  Patient will likely need 

an ERCP for removal of stone.  Surgery has reached out to GI Dr. Cedillo to 

possibly complete the procedure. They will continue to follow in house. Follow 

up with Surgery in regards to possible ERCP.


All management per Dr. Stefan Car, PGY-2

## 2019-02-13 NOTE — CP.PCM.PN
<Brittaney Alfonso - Last Filed: 02/13/19 19:06>





Subjective





- Date & Time of Evaluation


Date of Evaluation: 02/13/19


Time of Evaluation: 10:00





- Subjective


Subjective: 


HEPATOBILIARY SURGERY PROGRESS NOTE FOR DR. ALBRIGHT





Patient seen and examined at bedside. She reports nausea but no vomiting today, 

although did vomit yesterday. Denies abdominal pain. Pt was started on a liquid 

diet but she isn't eating much due to nausea.








Objective





- Vital Signs/Intake and Output


Vital Signs (last 24 hours): 


                                        











Temp Pulse Resp BP Pulse Ox


 


 98.8 F   58 L  20   126/77   96 


 


 02/13/19 07:00  02/13/19 07:00  02/13/19 07:00  02/13/19 07:00  02/13/19 07:00











- Medications


Medications: 


                               Current Medications





Acetaminophen (Tylenol 325mg Tab)  650 mg PO Q6 PRN


   PRN Reason: Pain, Mild (1-3)


Enoxaparin Sodium (Lovenox)  40 mg SC DAILY [facility]


   Last Admin: 02/11/19 09:42 Dose:  40 mg


Meropenem 1 gm/ Sodium (Chloride)  100 mls @ 100 mls/hr IVPB Q8H [facility]; Protocol


   Last Admin: 02/13/19 06:33 Dose:  100 mls/hr


Lactated Ringer's (Lactated Ringer's)  1,000 mls @ 125 mls/hr IV .Q8H [facility]


   Last Admin: 02/13/19 06:34 Dose:  125 mls/hr


Ondansetron HCl (Zofran Inj)  4 mg IVP Q4 PRN


   PRN Reason: Nausea/Vomiting


   Last Admin: 02/13/19 09:59 Dose:  4 mg


Potassium Chloride (K-Dur 20 Meq Er Tab)  20 meq PO DAILY [facility]


   Last Admin: 02/13/19 10:02 Dose:  20 meq


Potassium Phos/Sodium Phos (Neutra-Phos)  1 pkt PO ONCE ONE


   Stop: 02/13/19 11:43











- Labs


Labs: 


                                        





                                 02/13/19 07:35 





                                 02/13/19 07:35 





                                        











PT  12.0 SECONDS (9.7-12.2)   02/09/19  11:24    


 


INR  1.1   02/09/19  11:24    


 


APTT  32 SECONDS (21-34)   02/09/19  11:24    














- Constitutional


Appears: Non-toxic, No Acute Distress





- Head Exam


Head Exam: ATRAUMATIC, NORMAL INSPECTION





- Eye Exam


Eye Exam: EOMI, Normal appearance





- Respiratory Exam


Respiratory Exam: NORMAL BREATHING PATTERN.  absent: Respiratory Distress





- Cardiovascular Exam


Cardiovascular Exam: +S1, +S2





- GI/Abdominal Exam


GI & Abdominal Exam: Soft.  absent: Distended, Firm, Guarding, Rigid, 

Tenderness, Rebound





- Neurological Exam


Neurological Exam: Alert, Awake, Oriented x3





- Psychiatric Exam


Psychiatric exam: Normal Affect, Normal Mood





- Skin


Skin Exam: Dry, Warm





Assessment and Plan





- Assessment and Plan (Free Text)


Assessment: 


34yo F with PMHx of choledocholithiasis s/p ERCP w/ stent in 2014 who presented 

with abdominal pain, CBD stent was found to have choledocholithiasis, possible 

cholangitis, now s/p ERCP on 2/11 with removal of stent but no removal of 

stones. 





- Repeat MRCP from 2/12: 6x8 & 8x10mm filling defect within mid to distal CBD, 

CBD diameter 9mm


- Bilirubin 1.7 from 2.5 yesterday, LFTs continue to trend down


- May need repeat ERCP with endoscopic removal of CBD stones


- Discussed with Dr. Ernesto Alfonso PGY-4








<Yan Albright - Last Filed: 02/14/19 08:45>





Objective





- Vital Signs/Intake and Output


Vital Signs (last 24 hours): 


                                        











Temp Pulse Resp BP Pulse Ox


 


 98.3 F   60   20   119/81   97 


 


 02/14/19 07:53  02/14/19 07:53  02/14/19 07:53  02/14/19 07:53  02/14/19 07:53








Intake and Output: 


                                        











 02/14/19 02/14/19





 06:59 18:59


 


Intake Total 1000 1000


 


Balance 1000 1000














- Medications


Medications: 


                               Current Medications





Acetaminophen (Tylenol 325mg Tab)  650 mg PO Q6 PRN


   PRN Reason: Pain, Mild (1-3)


Enoxaparin Sodium (Lovenox)  40 mg SC DAILY [facility]


   Last Admin: 02/11/19 09:42 Dose:  40 mg


Meropenem 1 gm/ Sodium (Chloride)  100 mls @ 100 mls/hr IVPB Q8H [facility]; Protocol


   Last Admin: 02/14/19 06:46 Dose:  100 mls/hr


Lactated Ringer's (Lactated Ringer's)  1,000 mls @ 125 mls/hr IV .Q8H [facility]


   Last Admin: 02/14/19 01:08 Dose:  125 mls/hr


Metronidazole (Flagyl)  500 mg in 100 mls @ 100 mls/hr IVPB Q8H [facility]; Protocol


   Last Admin: 02/14/19 01:05 Dose:  100 mls/hr


Potassium Chloride/Dextrose/Sod Cl (Potassium Chl 40 Meq In D5-1/2ns)  1,000 mls

@ 100 mls/hr IV .Q10H [facility]


Magnesium Sulfate/Dextrose (Magnesium Sulfate 1 Gm/100 Ml D5w)  1 gm in 100 mls 

@ 300 mls/hr IVPB Q30M [facility]


   Stop: 02/14/19 09:04


Ondansetron HCl (Zofran Inj)  4 mg IVP Q4 PRN


   PRN Reason: Nausea/Vomiting


   Last Admin: 02/13/19 14:18 Dose:  4 mg


Potassium Chloride (K-Dur 20 Meq Er Tab)  20 meq PO DAILY [facility]


   Last Admin: 02/13/19 10:02 Dose:  20 meq


Ursodiol (Actigall)  300 mg PO BID [facility]


   Last Admin: 02/13/19 23:46 Dose:  300 mg











- Labs


Labs: 


                                        





                                 02/14/19 06:37 





                                 02/14/19 06:37 





                                        











PT  12.0 SECONDS (9.7-12.2)   02/09/19  11:24    


 


INR  1.1   02/09/19  11:24    


 


APTT  32 SECONDS (21-34)   02/09/19  11:24    














Assessment and Plan





- Assessment and Plan (Free Text)


Assessment: 


Patient improved, labs improved, abdomen nontender, tolerates diet.  Patient can

be discharged, and I will see in office.  All medical record entries made by the

Scribe were at my direction and personally dictated by me. I have reviewed the 

chart and agree that the record accurately reflects my personal performance of 

the history, physical exam, medical decision making, and the department course 

for this patient. I have also personally directed, reviewed, and agree with the 

resident note.

## 2019-02-14 NOTE — CP.PCM.PN
<DelmyNguyen - Last Filed: 02/14/19 14:42>





Subjective





- Date & Time of Evaluation


Date of Evaluation: 02/14/19


Time of Evaluation: 06:30





- Subjective


Subjective: 


HBP Surgery


Dr. Albright





Pt seen and examined @bedside. No acute evens overnight. pt has no complaints. 

reports improved N/V. denies abd pain, F/C, D/C. tolerating diet. 





Objective





- Vital Signs/Intake and Output


Vital Signs (last 24 hours): 


                                        











Temp Pulse Resp BP Pulse Ox


 


 98.3 F   60   20   119/81   97 


 


 02/14/19 07:53  02/14/19 07:53  02/14/19 07:53  02/14/19 07:53  02/14/19 07:53








Intake and Output: 


                                        











 02/14/19 02/14/19





 06:59 18:59


 


Intake Total 1000 1000


 


Balance 1000 1000














- Medications


Medications: 


                               Current Medications





Acetaminophen (Tylenol 325mg Tab)  650 mg PO Q6 PRN


   PRN Reason: Pain, Mild (1-3)


   Last Admin: 02/14/19 10:25 Dose:  650 mg


Enoxaparin Sodium (Lovenox)  40 mg SC DAILY [facility]


   Last Admin: 02/14/19 09:27 Dose:  40 mg


Meropenem 1 gm/ Sodium (Chloride)  100 mls @ 100 mls/hr IVPB Q8H [facility]; Protocol


   Last Admin: 02/14/19 06:46 Dose:  100 mls/hr


Lactated Ringer's (Lactated Ringer's)  1,000 mls @ 125 mls/hr IV .Q8H [facility]


   Last Admin: 02/14/19 09:11 Dose:  Not Given


Metronidazole (Flagyl)  500 mg in 100 mls @ 100 mls/hr IVPB Q8H [facility]; Protocol


   Last Admin: 02/14/19 01:05 Dose:  100 mls/hr


Potassium Chloride/Dextrose/Sod Cl (Potassium Chl 40 Meq In D5-1/2ns)  1,000 mls

@ 100 mls/hr IV .Q10H [facility]


   Last Admin: 02/14/19 10:19 Dose:  100 mls/hr


Ondansetron HCl (Zofran Inj)  4 mg IVP Q4 PRN


   PRN Reason: Nausea/Vomiting


   Last Admin: 02/13/19 14:18 Dose:  4 mg


Potassium Chloride (K-Dur 20 Meq Er Tab)  20 meq PO DAILY [facility]


   Last Admin: 02/14/19 09:27 Dose:  20 meq


Ursodiol (Actigall)  300 mg PO BID [facility]


   Last Admin: 02/14/19 09:27 Dose:  300 mg











- Labs


Labs: 


                                        





                                 02/14/19 06:37 





                                 02/14/19 06:37 





                                        











PT  12.0 SECONDS (9.7-12.2)   02/09/19  11:24    


 


INR  1.1   02/09/19  11:24    


 


APTT  32 SECONDS (21-34)   02/09/19  11:24    














- Constitutional


Appears: Non-toxic, No Acute Distress





- Head Exam


Head Exam: NORMAL INSPECTION





- Eye Exam


Eye Exam: Normal appearance





- ENT Exam


ENT Exam: Mucous Membranes Moist





- Respiratory Exam


Respiratory Exam: NORMAL BREATHING PATTERN.  absent: Accessory Muscle Use, 

Respiratory Distress





- Cardiovascular Exam


Cardiovascular Exam: REGULAR RHYTHM.  absent: Bradycardia, Tachycardia





- GI/Abdominal Exam


GI & Abdominal Exam: Soft.  absent: Distended, Guarding, Tenderness, Rebound





- Extremities Exam


Extremities Exam: Normal Inspection





- Neurological Exam


Neurological Exam: Alert, Awake, Oriented x3





- Psychiatric Exam


Psychiatric exam: Normal Affect, Normal Mood





- Skin


Skin Exam: Dry, Intact, Normal Color, Warm





Assessment and Plan





- Assessment and Plan (Free Text)


Assessment: 





32 y/o F s/p cholecystectomy w/ recurrent choledocholithiasis, possible cholang

itis, s/p ERCP w/ stent removal on 2/11 and improved transaminitis and 

hyperbilirubinemia.


Plan: 


-continued improvement of labs


- May need repeat ERCP for lithotripsy


- advance diet as tolerated


- no surgical intervention at this time





Pt discussed w/ Dr. Escobar DO PGY3





<Yan Albright - Last Filed: 02/14/19 15:13>





Objective





- Vital Signs/Intake and Output


Vital Signs (last 24 hours): 


                                        











Temp Pulse Resp BP Pulse Ox


 


 98.3 F   60   20   119/81   97 


 


 02/14/19 07:53  02/14/19 07:53  02/14/19 07:53  02/14/19 07:53  02/14/19 07:53








Intake and Output: 


                                        











 02/14/19 02/14/19





 06:59 18:59


 


Intake Total 1000 1000


 


Balance 1000 1000














- Medications


Medications: 


                               Current Medications





Acetaminophen (Tylenol 325mg Tab)  650 mg PO Q6 PRN


   PRN Reason: Pain, Mild (1-3)


   Last Admin: 02/14/19 10:25 Dose:  650 mg


Enoxaparin Sodium (Lovenox)  40 mg SC DAILY [facility]


   Last Admin: 02/14/19 09:27 Dose:  40 mg


Meropenem 1 gm/ Sodium (Chloride)  100 mls @ 100 mls/hr IVPB Q8H [facility]; Protocol


   Last Admin: 02/14/19 06:46 Dose:  100 mls/hr


Lactated Ringer's (Lactated Ringer's)  1,000 mls @ 125 mls/hr IV .Q8H [facility]


   Last Admin: 02/14/19 09:11 Dose:  Not Given


Metronidazole (Flagyl)  500 mg in 100 mls @ 100 mls/hr IVPB Q8H [facility]; Protocol


   Last Admin: 02/14/19 01:05 Dose:  100 mls/hr


Potassium Chloride/Dextrose/Sod Cl (Potassium Chl 40 Meq In D5-1/2ns)  1,000 mls

@ 100 mls/hr IV .Q10H [facility]


   Last Admin: 02/14/19 10:19 Dose:  100 mls/hr


Ondansetron HCl (Zofran Inj)  4 mg IVP Q4 PRN


   PRN Reason: Nausea/Vomiting


   Last Admin: 02/13/19 14:18 Dose:  4 mg


Potassium Chloride (K-Dur 20 Meq Er Tab)  20 meq PO DAILY [facility]


   Last Admin: 02/14/19 09:27 Dose:  20 meq


Ursodiol (Actigall)  300 mg PO BID [facility]


   Last Admin: 02/14/19 09:27 Dose:  300 mg











- Labs


Labs: 


                                        





                                 02/14/19 06:37 





                                 02/14/19 06:37 





                                        











PT  12.0 SECONDS (9.7-12.2)   02/09/19  11:24    


 


INR  1.1   02/09/19  11:24    


 


APTT  32 SECONDS (21-34)   02/09/19  11:24    














Assessment and Plan





- Assessment and Plan (Free Text)


Assessment: 





Patient improved, labs improved, will see in office. All medical record entries 

made by the Scribe were at my direction and personally dictated by me. I have 

reviewed the chart and agree that the record accurately reflects my personal 

performance of the history, physical exam, medical decision making, and the 

department course for this patient. I have also personally directed, reviewed, 

and agree with resident note and disposition.

## 2019-02-14 NOTE — CP.PCM.DIS
<Camila Hernandez - Last Filed: 02/14/19 16:58>





Provider





- Provider


Date of Admission: 


02/09/19 04:49





Attending physician: 


Armin Aviles MD





Primary care physician: 


Dr. Aviles


Consults: 








02/09/19 05:45


Physician Consult Routine 


   Comment: 


   Consulting Provider: Lubna Benítez


   Consulting Physician: Lubna Benítez


   Reason for Consult: intractable abdominal pain with vomiting





02/09/19 06:35


Physician Consult Routine 


   Comment: 


   Consulting Provider: Roscoe Nunez Jr.


   Consulting Physician: Roscoe Nunez Jr.


   Reason for Consult: intractable abdominal pain and vomiting





02/10/19 09:18


Infectious Disease Consult Routine 


   Comment: 


   Consulting Provider: Addison Machado


   Consulting Physician: Addison Machado


   Reason for Consult: possible cholangitis





02/11/19 15:53


Physician Consult Routine 


   Comment: 


   Consulting Provider: Yan Orozco


   Consulting Physician: Yan Orozco


   Reason for Consult: CBD exploration











Time Spent in preparation of Discharge (in minutes): 45





Hospital Course





- Lab Results


Lab Results: 


                                  Micro Results





02/11/19 01:00   Stool   Ova and Parasite Concentrate Exam - Final





                             Most Recent Lab Values











WBC  7.4 K/uL (4.8-10.8)   02/14/19  06:37    


 


RBC  4.07 Mil/uL (3.80-5.20)   02/14/19  06:37    


 


Hgb  12.4 g/dL (11.0-16.0)   02/14/19  06:37    


 


Hct  36.4 % (34.0-47.0)   02/14/19  06:37    


 


MCV  89.4 fL (81.0-99.0)   02/14/19  06:37    


 


MCH  30.4 pg (27.0-31.0)   02/14/19  06:37    


 


MCHC  34.0 g/dL (33.0-37.0)   02/14/19  06:37    


 


RDW  12.9 % (11.5-14.5)   02/14/19  06:37    


 


Plt Count  215 K/uL (130-400)   02/14/19  06:37    


 


MPV  8.4 fL (7.2-11.7)   02/14/19  06:37    


 


Neut % (Auto)  50.5 % (50.0-75.0)   02/14/19  06:37    


 


Lymph % (Auto)  31.5 % (20.0-40.0)   02/14/19  06:37    


 


Mono % (Auto)  15.2 % (0.0-10.0)  H  02/14/19  06:37    


 


Eos % (Auto)  2.2 % (0.0-4.0)   02/14/19  06:37    


 


Baso % (Auto)  0.6 % (0.0-2.0)   02/14/19  06:37    


 


Neut # (Auto)  3.7 K/uL (1.8-7.0)   02/14/19  06:37    


 


Lymph # (Auto)  2.3 K/uL (1.0-4.3)   02/14/19  06:37    


 


Mono # (Auto)  1.1 K/uL (0.0-0.8)  H  02/14/19  06:37    


 


Eos # (Auto)  0.2 K/uL (0.0-0.7)   02/14/19  06:37    


 


Baso # (Auto)  0.0 K/uL (0.0-0.2)   02/14/19  06:37    


 


Neutrophils % (Manual)  76 % (50-75)  H  02/11/19  07:24    


 


Band Neutrophils %  12 % (0-2)  H*  02/11/19  07:24    


 


Lymphocytes % (Manual)  5 % (20-40)  L  02/11/19  07:24    


 


Monocytes % (Manual)  5 % (0-10)   02/11/19  07:24    


 


Eosinophils % (Manual)  2 % (0-4)   02/11/19  07:24    


 


Toxic Granulation  Present   02/10/19  07:18    


 


Platelet Estimate  Normal  (NORMAL)   02/11/19  07:24    


 


Large Platelets  Present   02/11/19  07:24    


 


RBC Morphology  Normal   02/11/19  07:24    


 


Anisocytosis (manual)  Slight   02/10/19  07:18    


 


PT  12.0 SECONDS (9.7-12.2)   02/09/19  11:24    


 


INR  1.1   02/09/19  11:24    


 


APTT  32 SECONDS (21-34)   02/09/19  11:24    


 


Sodium  135 mmol/L (132-148)   02/14/19  06:37    


 


Potassium  3.0 mmol/L (3.6-5.2)  L  02/14/19  06:37    


 


Chloride  103 mmol/L ()   02/14/19  06:37    


 


Carbon Dioxide  23 mmol/L (22-30)   02/14/19  06:37    


 


Anion Gap  12  (10-20)   02/14/19  06:37    


 


BUN  4 mg/dL (7-17)  L  02/14/19  06:37    


 


Creatinine  0.4 mg/dL (0.7-1.2)  L  02/14/19  06:37    


 


Est GFR ( Amer)  > 60   02/14/19  06:37    


 


Est GFR (Non-Af Amer)  > 60   02/14/19  06:37    


 


POC Glucose (mg/dL)  86 mg/dL ()   02/13/19  11:34    


 


Random Glucose  97 mg/dL ()   02/14/19  06:37    


 


Calcium  7.6 mg/dl (8.6-10.4)  L  02/14/19  06:37    


 


Phosphorus  2.2 mg/dL (2.5-4.5)  L  02/13/19  07:35    


 


Magnesium  1.8 mg/dL (1.6-2.3)   02/14/19  06:37    


 


Total Bilirubin  1.4 mg/dL (0.2-1.3)  H  02/14/19  06:37    


 


AST  69 U/L (14-36)  H  02/14/19  06:37    


 


ALT  193 U/L (9-52)  H D 02/14/19  06:37    


 


Alkaline Phosphatase  164 U/L ()  H  02/14/19  06:37    


 


Total Protein  5.8 g/dL (6.3-8.3)  L  02/14/19  06:37    


 


Albumin  3.2 g/dL (3.5-5.0)  L  02/14/19  06:37    


 


Globulin  2.6 gm/dL (2.2-3.9)   02/14/19  06:37    


 


Albumin/Globulin Ratio  1.2  (1.0-2.1)   02/14/19  06:37    


 


Amylase  83 U/L ()   02/09/19  03:29    


 


Lipase  225 U/L ()   02/09/19  03:29    


 


Beta HCG, Quant  < 2.39 mIU/ML  02/09/19  03:29    


 


Urine Color  Sarah  (YELLOW)   02/09/19  03:29    


 


Urine Clarity  Clear  (Clear)   02/09/19  03:29    


 


Urine pH  5.0  (5.0-8.0)   02/09/19  03:29    


 


Ur Specific Gravity  1.011  (1.003-1.030)   02/09/19  03:29    


 


Urine Protein  Negative mg/dL (NEGATIVE)   02/09/19  03:29    


 


Urine Glucose (UA)  Normal mg/dL (Normal)   02/09/19  03:29    


 


Urine Ketones  Negative mg/dL (NEGATIVE)   02/09/19  03:29    


 


Urine Blood  Trace  (NEGATIVE)  H  02/09/19  03:29    


 


Urine Nitrate  Negative  (NEGATIVE)   02/09/19  03:29    


 


Urine Bilirubin  Negative  (NEGATIVE)   02/09/19  03:29    


 


Urine Urobilinogen  4.0 mg/dL (0.2-1.0)  H  02/09/19  03:29    


 


Ur Leukocyte Esterase  Neg Ana/uL (Negative)   02/09/19  03:29    


 


Urine WBC (Auto)  1 /hpf (0-5)   02/09/19  03:29    


 


Urine RBC (Auto)  < 1 /hpf (0-3)   02/09/19  03:29    


 


Ur Squamous Epith Cells  7 /hpf (0-5)  H  02/09/19  03:29    


 


Urine Bacteria  Rare  (<OCC)   02/09/19  03:29    


 


Urine HCG, Qual  Negative  (NEGATIVE)   02/12/19  07:50    


 


Stool Leukocytes, Qual  Negative  (NEGATIVE)   02/10/19  15:25    


 


Hepatitis A IgM Ab  Negative  (NEGATIVE)   02/09/19  11:24    


 


Hep Bs Antigen  Negative  (NEGATIVE)   02/09/19  11:24    


 


Hep B Core IgM Ab  Negative  (NEGATIVE)   02/09/19  11:24    


 


Hepatitis C Antibody  Negative  (NEGATIVE)   02/09/19  11:24    


 


HIV 1&2 Antibody Screen  Negative  (NEGATIVE)   02/10/19  16:49    


 


Blood Type  O POSITIVE   02/13/19  08:05    


 


Antibody Screen  Negative   02/13/19  08:05    














- Hospital Course


Hospital Course: 


HPI: 33 year old female with presented to the emergency department with diffuse 

abdominal pain for close to a week in duration. Patient described it as sharp in

nature with radiation to her back. Patient reports the pain being most severe in

the right upper quadrant.  Patient also had episode of emesis and blood bowel 

movements when symptoms initially began.  Patient seen and examined at bedside. 

Per nursing no acute events occurred overnight .Patient does report a headache 

during today's visit. Patient denies any chest pain, shortness of breath, 

fevers, chills, syncopal episodes, or any other complaints.





Hospital Course: Patient was admitted on 2/9/19 for abdominal pain. The patient 

had an abdominal CT which showed colonic wall thickening/mucosal edema, possible

colitis, questionable rectal wall thickening, CBD stent, CBD dilated w/ soft 

tissue density in lumen, r/o neoplasm vs sludge or noncalcified gallstones, 

3.1cm probable left ovarian cyst, small hiatal hernia. Pelvic US was ordered and

showed a left ovarian cyst. Surgery (Dr. Nunez) and Hepatobiliary (Dr. Orozco) were consulted. MRCP was then done and showed 6x8mm and 8x10mm 

defects within the mid to distal common bile duct; the common bile duct appears 

dilated measuring up to 9mm in diameter Repeat MRCP from 2/12: 6x8 & 8x10mm 

filling defect within mid to distal CBD, CBD diameter 9mm. Patient was no longer

symptomatic and was started on Ursodiol twice daily. Patient was cleared by Dr. Orozco for discharge. Patient must follow up with Dr. Orozco within one 

week and must continue taking Ursodiol. During the patients hospital course, she

also had elevated transaminases. Hepatitis panel and HIV were negative. Her 

transaminitis normalized. The patient was also hypokalemic and was given 

potassium to replete. Patient is stable for discharge. Patient must follow up 

with PMD within one week of discharge.





This is a brief summary of the hospital course. Please see EMR for more details.





Discharge Exam





- Head Exam


Head Exam: ATRAUMATIC, NORMAL INSPECTION





- Eye Exam


Eye Exam: EOMI, Normal appearance





- ENT Exam


ENT Exam: Mucous Membranes Moist





- Respiratory Exam


Respiratory Exam: Clear to PA & Lateral, NORMAL BREATHING PATTERN, UNREMARKABLE.

 absent: Rales, Rhonchi, Wheezes, Respiratory Distress





- GI/Abdominal Exam


GI & Abdominal Exam: Normal Bowel Sounds, Soft, Unremarkable.  absent: Distende

d, Firm, Guarding, Tenderness





- Extremities Exam


Extremities exam: normal inspection





- Neurological Exam


Neurological exam: Alert, Oriented x3





- Psychiatric Exam


Psychiatric exam: Normal Affect, Normal Mood





- Skin


Skin Exam: Dry, Intact, Normal Color, Warm





Discharge Plan





- Discharge Medications


Prescriptions: 


RX: Potassium Chloride [K-Dur 20 mEq ER Tab] 20 meq PO DAILY #5 tab


RX: Ursodiol [Actigall] 300 mg PO BID #60 cap





- Follow Up Plan


Condition: STABLE


Disposition: HOME/ ROUTINE


Instructions:  Low Cholesterol, Saturated Fat, and Trans Fat Diet , Endoscopic 

Retrograde Cholangiopancreatography (DC), Potassium Chloride, Ursodiol


Additional Instructions: 


Patient is stable for discharge to home. Patient can continue home medications. 


Patient must containue taking new medications as instructed:


1. Ursodiol 300mg PO BID- take 1 tablet every 12 hours.


2. K-Dur 20mEq PO daily- take 1 tablet daily for 5 days.


Patient must follow up with Dr. Orozco within 1 week of discharge.


Patient must follow up with PMD, Dr. Aviles, within 1 week of discharge.


IF symptoms worsen or reoccur, patient should return to the nearest emergency 

room.








El paciente se encuentra estable para el emily hospitalaria. El paciente puede 

continuar con los medicamentos en casa.


El paciente debe contener la holly de nuevos medicamentos segn las 

instrucciones:


1. Ursodiol 300 mg PO BID: johan 1 tableta cada 12 horas.


2. K-Dur 20mEq PO diario: johan 1 comprimido al da maulik 5 mcmullen.


El paciente debe realizar un seguimiento con el Dr. Orozco dentro de la 

primera semana despus del emily.


El paciente debe hacer un seguimiento con PMD, Dr. Aviles, dentro de la primera 

semana despus del emily.


Si los sntomas empeoran o reaparecen, el paciente debe regresar a la gallo de 

emergencias ms cercana.


Referrals: 


Yan Orozco MD [Staff Provider] - 


Armin Aviles MD [Family Provider] - 





<Cuba Andrade - Last Filed: 02/15/19 09:05>





Provider





- Provider


Date of Admission: 


02/09/19 04:49





Attending physician: 


Armin Aviles MD





Consults: 








02/09/19 05:45


Physician Consult Routine 


   Comment: 


   Consulting Provider: Lubna Benítez


   Consulting Physician: Lubna Benítez


   Reason for Consult: intractable abdominal pain with vomiting





02/09/19 06:35


Physician Consult Routine 


   Comment: 


   Consulting Provider: Roscoe Nunez Jr.


   Consulting Physician: Roscoe Nunez Jr.


   Reason for Consult: intractable abdominal pain and vomiting





02/10/19 09:18


Infectious Disease Consult Routine 


   Comment: 


   Consulting Provider: Addison Machado


   Consulting Physician: Addison Machado


   Reason for Consult: possible cholangitis





02/11/19 15:53


Physician Consult Routine 


   Comment: 


   Consulting Provider: Yan Orozco


   Consulting Physician: Yan Orozco


   Reason for Consult: CBD exploration














Hospital Course





- Lab Results


Lab Results: 


                                  Micro Results





02/11/19 01:00   Stool   Ova and Parasite Concentrate Exam - Final





                             Most Recent Lab Values











WBC  7.4 K/uL (4.8-10.8)   02/14/19  06:37    


 


RBC  4.07 Mil/uL (3.80-5.20)   02/14/19  06:37    


 


Hgb  12.4 g/dL (11.0-16.0)   02/14/19  06:37    


 


Hct  36.4 % (34.0-47.0)   02/14/19  06:37    


 


MCV  89.4 fL (81.0-99.0)   02/14/19  06:37    


 


MCH  30.4 pg (27.0-31.0)   02/14/19  06:37    


 


MCHC  34.0 g/dL (33.0-37.0)   02/14/19  06:37    


 


RDW  12.9 % (11.5-14.5)   02/14/19  06:37    


 


Plt Count  215 K/uL (130-400)   02/14/19  06:37    


 


MPV  8.4 fL (7.2-11.7)   02/14/19  06:37    


 


Neut % (Auto)  50.5 % (50.0-75.0)   02/14/19  06:37    


 


Lymph % (Auto)  31.5 % (20.0-40.0)   02/14/19  06:37    


 


Mono % (Auto)  15.2 % (0.0-10.0)  H  02/14/19  06:37    


 


Eos % (Auto)  2.2 % (0.0-4.0)   02/14/19  06:37    


 


Baso % (Auto)  0.6 % (0.0-2.0)   02/14/19  06:37    


 


Neut # (Auto)  3.7 K/uL (1.8-7.0)   02/14/19  06:37    


 


Lymph # (Auto)  2.3 K/uL (1.0-4.3)   02/14/19  06:37    


 


Mono # (Auto)  1.1 K/uL (0.0-0.8)  H  02/14/19  06:37    


 


Eos # (Auto)  0.2 K/uL (0.0-0.7)   02/14/19  06:37    


 


Baso # (Auto)  0.0 K/uL (0.0-0.2)   02/14/19  06:37    


 


Neutrophils % (Manual)  76 % (50-75)  H  02/11/19  07:24    


 


Band Neutrophils %  12 % (0-2)  H*  02/11/19  07:24    


 


Lymphocytes % (Manual)  5 % (20-40)  L  02/11/19  07:24    


 


Monocytes % (Manual)  5 % (0-10)   02/11/19  07:24    


 


Eosinophils % (Manual)  2 % (0-4)   02/11/19  07:24    


 


Toxic Granulation  Present   02/10/19  07:18    


 


Platelet Estimate  Normal  (NORMAL)   02/11/19  07:24    


 


Large Platelets  Present   02/11/19  07:24    


 


RBC Morphology  Normal   02/11/19  07:24    


 


Anisocytosis (manual)  Slight   02/10/19  07:18    


 


PT  12.0 SECONDS (9.7-12.2)   02/09/19  11:24    


 


INR  1.1   02/09/19  11:24    


 


APTT  32 SECONDS (21-34)   02/09/19  11:24    


 


Sodium  135 mmol/L (132-148)   02/14/19  06:37    


 


Potassium  3.0 mmol/L (3.6-5.2)  L  02/14/19  06:37    


 


Chloride  103 mmol/L ()   02/14/19  06:37    


 


Carbon Dioxide  23 mmol/L (22-30)   02/14/19  06:37    


 


Anion Gap  12  (10-20)   02/14/19  06:37    


 


BUN  4 mg/dL (7-17)  L  02/14/19  06:37    


 


Creatinine  0.4 mg/dL (0.7-1.2)  L  02/14/19  06:37    


 


Est GFR ( Amer)  > 60   02/14/19  06:37    


 


Est GFR (Non-Af Amer)  > 60   02/14/19  06:37    


 


POC Glucose (mg/dL)  138 mg/dL ()  H  02/14/19  11:49    


 


Random Glucose  97 mg/dL ()   02/14/19  06:37    


 


Calcium  7.6 mg/dl (8.6-10.4)  L  02/14/19  06:37    


 


Phosphorus  2.2 mg/dL (2.5-4.5)  L  02/13/19  07:35    


 


Magnesium  1.8 mg/dL (1.6-2.3)   02/14/19  06:37    


 


Total Bilirubin  1.4 mg/dL (0.2-1.3)  H  02/14/19  06:37    


 


AST  69 U/L (14-36)  H  02/14/19  06:37    


 


ALT  193 U/L (9-52)  H D 02/14/19  06:37    


 


Alkaline Phosphatase  164 U/L ()  H  02/14/19  06:37    


 


Total Protein  5.8 g/dL (6.3-8.3)  L  02/14/19  06:37    


 


Albumin  3.2 g/dL (3.5-5.0)  L  02/14/19  06:37    


 


Globulin  2.6 gm/dL (2.2-3.9)   02/14/19  06:37    


 


Albumin/Globulin Ratio  1.2  (1.0-2.1)   02/14/19  06:37    


 


Amylase  83 U/L ()   02/09/19  03:29    


 


Lipase  225 U/L ()   02/09/19  03:29    


 


Beta HCG, Quant  < 2.39 mIU/ML  02/09/19  03:29    


 


Urine Color  Sarah  (YELLOW)   02/09/19  03:29    


 


Urine Clarity  Clear  (Clear)   02/09/19  03:29    


 


Urine pH  5.0  (5.0-8.0)   02/09/19  03:29    


 


Ur Specific Gravity  1.011  (1.003-1.030)   02/09/19  03:29    


 


Urine Protein  Negative mg/dL (NEGATIVE)   02/09/19  03:29    


 


Urine Glucose (UA)  Normal mg/dL (Normal)   02/09/19  03:29    


 


Urine Ketones  Negative mg/dL (NEGATIVE)   02/09/19  03:29    


 


Urine Blood  Trace  (NEGATIVE)  H  02/09/19  03:29    


 


Urine Nitrate  Negative  (NEGATIVE)   02/09/19  03:29    


 


Urine Bilirubin  Negative  (NEGATIVE)   02/09/19  03:29    


 


Urine Urobilinogen  4.0 mg/dL (0.2-1.0)  H  02/09/19  03:29    


 


Ur Leukocyte Esterase  Neg Ana/uL (Negative)   02/09/19  03:29    


 


Urine WBC (Auto)  1 /hpf (0-5)   02/09/19  03:29    


 


Urine RBC (Auto)  < 1 /hpf (0-3)   02/09/19  03:29    


 


Ur Squamous Epith Cells  7 /hpf (0-5)  H  02/09/19  03:29    


 


Urine Bacteria  Rare  (<OCC)   02/09/19  03:29    


 


Urine HCG, Qual  Negative  (NEGATIVE)   02/12/19  07:50    


 


Stool Leukocytes, Qual  Negative  (NEGATIVE)   02/10/19  15:25    


 


Hepatitis A IgM Ab  Negative  (NEGATIVE)   02/09/19  11:24    


 


Hep Bs Antigen  Negative  (NEGATIVE)   02/09/19  11:24    


 


Hep B Core IgM Ab  Negative  (NEGATIVE)   02/09/19  11:24    


 


Hepatitis C Antibody  Negative  (NEGATIVE)   02/09/19  11:24    


 


HIV 1&2 Antibody Screen  Negative  (NEGATIVE)   02/10/19  16:49    


 


Blood Type  O POSITIVE   02/13/19  08:05    


 


Antibody Screen  Negative   02/13/19  08:05    














Attending/Attestation





- Attestation


I have personally seen and examined this patient.: Yes


I have fully participated in the care of the patient.: Yes


I have reviewed all pertinent clinical information, including history, physical 

exam and plan: Yes

## 2019-02-17 NOTE — CON
DATE:  02/09/2019



This is from Dr. Jones to  _____.



I was called for GI consultation by the admitting MD.  The entire chart is

reviewed including, but not limited to most recent lab and radiology study

results, current and the previous medication list, current and the previous

medical events, the patient is seen and fully examined on 02/09/2019, case

discussed with the staff at length, a short handwritten consultation sheet

left in the chart at the time of my physical examination and GI

consultation on 02/09/2019.



HISTORY OF PRESENT ILLNESS:  This is a 33-year-old female who was admitted

to the hospital due to severe abdominal pain, recurrent nausea and vomiting

with poor oral intake, loss of appetite with dizziness, who was found to

have cholelithiasis with possible choledocholithiasis.  No chest pain or

palpitation.



LABORATORY DATA:  Lab results Initially at the time of the admission showed

normal CBC with blood glucose level of 114, potassium 3.4 with elevated

liver function test with reported total bilirubin around 2 and mildly

elevated AST and ALT.



PAST MEDICAL HISTORY:  Including peptic ulcer disease, common bile duct

stone with status post stent insertion in 11/2014 done by myself with

status post _____.  Status post cholecystectomy in 11/2014.



FAMILY HISTORY:  Unknown.



SOCIAL HISTORY:  No cigarette smoking or alcohol intake.



ALLERGIES TO MEDICATIONS:  UNCLEAR.



MEDICATIONS:  Post admission medication list reviewed.



PHYSICAL EXAMINATION:

GENERAL:  This is a 33-year-old female, awake, alert, oriented.

VITAL SIGNS:  Afebrile at the time she was seen by me with stable vital

signs.

HEENT:  Showed dry oral mucous membrane.  Slightly bilateral icteric

sclerae.

LUNGS:  Few scattered crepitation.  Decreased air entry at bases.

HEART:  Positive S1 and S2 with mild increase of rate.

ABDOMEN:  Soft, mildly obese with midepigastric and right upper quadrant

tenderness.  No mass or organomegaly.  No rebound tenderness or guarding.

RECTAL:  The patient refused.

EXTREMITIES:  Without edema, clubbing or cyanosis.

NEUROLOGIC:  No reported new neurological deficit, sensory or motor.  No

new reported focal deficits.



IMPRESSION:

1.  To rule out choledocholithiasis versus blockage of the biliary stent,

the patient was advised to change the biliary stent or remove it about 4

years ago, she never came to follow up in the office to remove it.

2.  To rule out an early stage of ascending cholangitis.

3.  Status post cholecystectomy.

4.  Electrolyte imbalance with hypokalemia.

5.  Re-exacerbation of peptic ulcer disease.



SUGGESTIONS:

1.  Continue current management.

2.  IV antibiotics.

3.  MRCP.

4.  The patient to be scheduled for ERCP with removal of the very old

biliary stent and possible biliary stent exchange.

5.  Surgical consultation.

6.  Further recommendation to follow and serum lipase and amylase level to

be ordered.

7.  Proton pump inhibitors IV.

8.  Reglan IV.

9.  Flagyl IV.

10.  Further recommendation to follow post ERCP.



Thank you for letting me to participate in your patient's case management. 

We will follow up closely with you.





__________________________________________

Lubna Jones MD





DD:  02/16/2019 19:56:59

DT:  02/16/2019 20:01:14

Job # [id number]

## 2019-03-22 ENCOUNTER — HOSPITAL ENCOUNTER (OUTPATIENT)
Dept: HOSPITAL 31 - C.USIC | Age: 34
End: 2019-03-22
Payer: MEDICAID

## 2019-04-17 ENCOUNTER — HOSPITAL ENCOUNTER (OUTPATIENT)
Dept: HOSPITAL 31 - C.ENDO | Age: 34
Discharge: HOME | End: 2019-04-17
Attending: INTERNAL MEDICINE
Payer: MEDICAID

## 2019-04-17 VITALS — TEMPERATURE: 99.3 F | HEART RATE: 80 BPM | OXYGEN SATURATION: 100 %

## 2019-04-17 VITALS — SYSTOLIC BLOOD PRESSURE: 116 MMHG | DIASTOLIC BLOOD PRESSURE: 64 MMHG

## 2019-04-17 VITALS — RESPIRATION RATE: 15 BRPM

## 2019-04-17 VITALS — BODY MASS INDEX: 29.1 KG/M2

## 2019-04-17 DIAGNOSIS — K64.8: Primary | ICD-10-CM

## 2019-04-17 DIAGNOSIS — K62.5: ICD-10-CM

## 2019-04-17 PROCEDURE — 45378 DIAGNOSTIC COLONOSCOPY: CPT

## 2019-04-17 PROCEDURE — 84703 CHORIONIC GONADOTROPIN ASSAY: CPT
